# Patient Record
Sex: FEMALE | Race: WHITE | NOT HISPANIC OR LATINO | Employment: FULL TIME | URBAN - METROPOLITAN AREA
[De-identification: names, ages, dates, MRNs, and addresses within clinical notes are randomized per-mention and may not be internally consistent; named-entity substitution may affect disease eponyms.]

---

## 2017-01-06 ENCOUNTER — COMMUNICATION - HEALTHEAST (OUTPATIENT)
Dept: LAB | Facility: CLINIC | Age: 47
End: 2017-01-06

## 2017-01-06 DIAGNOSIS — E03.9 HYPOTHYROIDISM: ICD-10-CM

## 2017-02-08 ENCOUNTER — AMBULATORY - HEALTHEAST (OUTPATIENT)
Dept: LAB | Facility: CLINIC | Age: 47
End: 2017-02-08

## 2017-02-08 DIAGNOSIS — E03.9 HYPOTHYROIDISM: ICD-10-CM

## 2017-02-08 LAB
CREAT SERPL-MCNC: 0.78 MG/DL (ref 0.6–1.1)
GFR SERPL CREATININE-BSD FRML MDRD: >60 ML/MIN/1.73M2

## 2017-02-12 ENCOUNTER — RECORDS - HEALTHEAST (OUTPATIENT)
Dept: ADMINISTRATIVE | Facility: OTHER | Age: 47
End: 2017-02-12

## 2017-02-14 ENCOUNTER — AMBULATORY - HEALTHEAST (OUTPATIENT)
Dept: ENDOCRINOLOGY | Facility: CLINIC | Age: 47
End: 2017-02-14

## 2017-02-14 DIAGNOSIS — E03.9 HYPOTHYROIDISM: ICD-10-CM

## 2017-02-15 ENCOUNTER — OFFICE VISIT - HEALTHEAST (OUTPATIENT)
Dept: FAMILY MEDICINE | Facility: CLINIC | Age: 47
End: 2017-02-15

## 2017-02-15 DIAGNOSIS — F41.1 ANXIETY STATE: ICD-10-CM

## 2017-02-15 DIAGNOSIS — E03.9 HYPOTHYROIDISM: ICD-10-CM

## 2017-02-17 ENCOUNTER — COMMUNICATION - HEALTHEAST (OUTPATIENT)
Dept: ADMINISTRATIVE | Facility: CLINIC | Age: 47
End: 2017-02-17

## 2017-03-02 ENCOUNTER — COMMUNICATION - HEALTHEAST (OUTPATIENT)
Dept: FAMILY MEDICINE | Facility: CLINIC | Age: 47
End: 2017-03-02

## 2017-03-02 DIAGNOSIS — F41.1 ANXIETY STATE: ICD-10-CM

## 2017-03-09 ENCOUNTER — OFFICE VISIT - HEALTHEAST (OUTPATIENT)
Dept: FAMILY MEDICINE | Facility: CLINIC | Age: 47
End: 2017-03-09

## 2017-03-09 DIAGNOSIS — H65.191 ACUTE MIDDLE EAR EFFUSION, RIGHT: ICD-10-CM

## 2017-03-09 DIAGNOSIS — J02.9 SORE THROAT: ICD-10-CM

## 2017-03-09 DIAGNOSIS — J06.9 VIRAL URI: ICD-10-CM

## 2017-03-13 ENCOUNTER — COMMUNICATION - HEALTHEAST (OUTPATIENT)
Dept: ENDOCRINOLOGY | Facility: CLINIC | Age: 47
End: 2017-03-13

## 2017-03-13 DIAGNOSIS — E03.9 HYPOTHYROIDISM: ICD-10-CM

## 2017-04-12 ENCOUNTER — AMBULATORY - HEALTHEAST (OUTPATIENT)
Dept: LAB | Facility: CLINIC | Age: 47
End: 2017-04-12

## 2017-04-12 DIAGNOSIS — E03.9 HYPOTHYROIDISM: ICD-10-CM

## 2017-04-12 DIAGNOSIS — F41.1 ANXIETY STATE: ICD-10-CM

## 2017-04-12 LAB
CHOLEST SERPL-MCNC: 136 MG/DL
FASTING STATUS PATIENT QL REPORTED: YES
HDLC SERPL-MCNC: 56 MG/DL
LDLC SERPL CALC-MCNC: 69 MG/DL
TRIGL SERPL-MCNC: 53 MG/DL

## 2017-04-14 ENCOUNTER — OFFICE VISIT - HEALTHEAST (OUTPATIENT)
Dept: ENDOCRINOLOGY | Facility: CLINIC | Age: 47
End: 2017-04-14

## 2017-04-14 DIAGNOSIS — C73 THYROID CANCER (H): ICD-10-CM

## 2017-04-14 DIAGNOSIS — E03.9 HYPOTHYROIDISM: ICD-10-CM

## 2017-04-14 ASSESSMENT — MIFFLIN-ST. JEOR: SCORE: 1164.27

## 2017-04-15 ENCOUNTER — COMMUNICATION - HEALTHEAST (OUTPATIENT)
Dept: FAMILY MEDICINE | Facility: CLINIC | Age: 47
End: 2017-04-15

## 2017-04-18 ENCOUNTER — COMMUNICATION - HEALTHEAST (OUTPATIENT)
Dept: ENDOCRINOLOGY | Facility: CLINIC | Age: 47
End: 2017-04-18

## 2017-05-27 ENCOUNTER — RECORDS - HEALTHEAST (OUTPATIENT)
Dept: ADMINISTRATIVE | Facility: OTHER | Age: 47
End: 2017-05-27

## 2017-07-05 ENCOUNTER — RECORDS - HEALTHEAST (OUTPATIENT)
Dept: ADMINISTRATIVE | Facility: OTHER | Age: 47
End: 2017-07-05

## 2017-07-05 LAB — PAP SMEAR - HIM PATIENT REPORTED: NORMAL

## 2017-07-08 ENCOUNTER — COMMUNICATION - HEALTHEAST (OUTPATIENT)
Dept: FAMILY MEDICINE | Facility: CLINIC | Age: 47
End: 2017-07-08

## 2017-07-08 DIAGNOSIS — F41.9 ANXIETY: ICD-10-CM

## 2017-09-06 ENCOUNTER — COMMUNICATION - HEALTHEAST (OUTPATIENT)
Dept: ADMINISTRATIVE | Facility: CLINIC | Age: 47
End: 2017-09-06

## 2017-09-06 ENCOUNTER — AMBULATORY - HEALTHEAST (OUTPATIENT)
Dept: ENDOCRINOLOGY | Facility: CLINIC | Age: 47
End: 2017-09-06

## 2017-09-06 DIAGNOSIS — E03.9 HYPOTHYROIDISM: ICD-10-CM

## 2017-09-13 ENCOUNTER — AMBULATORY - HEALTHEAST (OUTPATIENT)
Dept: LAB | Facility: CLINIC | Age: 47
End: 2017-09-13

## 2017-09-13 DIAGNOSIS — E03.9 HYPOTHYROIDISM: ICD-10-CM

## 2017-09-19 ENCOUNTER — COMMUNICATION - HEALTHEAST (OUTPATIENT)
Dept: ENDOCRINOLOGY | Facility: CLINIC | Age: 47
End: 2017-09-19

## 2017-10-02 ENCOUNTER — COMMUNICATION - HEALTHEAST (OUTPATIENT)
Dept: FAMILY MEDICINE | Facility: CLINIC | Age: 47
End: 2017-10-02

## 2017-10-09 ENCOUNTER — COMMUNICATION - HEALTHEAST (OUTPATIENT)
Dept: FAMILY MEDICINE | Facility: CLINIC | Age: 47
End: 2017-10-09

## 2017-10-09 DIAGNOSIS — F41.9 ANXIETY: ICD-10-CM

## 2017-10-26 ENCOUNTER — OFFICE VISIT - HEALTHEAST (OUTPATIENT)
Dept: FAMILY MEDICINE | Facility: CLINIC | Age: 47
End: 2017-10-26

## 2017-10-26 DIAGNOSIS — R53.83 FATIGUE: ICD-10-CM

## 2017-10-26 DIAGNOSIS — Z12.39 SCREENING FOR BREAST CANCER: ICD-10-CM

## 2017-10-26 DIAGNOSIS — Z12.31 ENCOUNTER FOR SCREENING MAMMOGRAM FOR MALIGNANT NEOPLASM OF BREAST: ICD-10-CM

## 2017-10-26 DIAGNOSIS — R10.9 ABDOMINAL PAIN: ICD-10-CM

## 2017-10-26 DIAGNOSIS — D12.6 ADENOMATOUS COLON POLYP: ICD-10-CM

## 2017-10-29 ENCOUNTER — COMMUNICATION - HEALTHEAST (OUTPATIENT)
Dept: FAMILY MEDICINE | Facility: CLINIC | Age: 47
End: 2017-10-29

## 2017-11-14 ENCOUNTER — COMMUNICATION - HEALTHEAST (OUTPATIENT)
Dept: FAMILY MEDICINE | Facility: CLINIC | Age: 47
End: 2017-11-14

## 2017-11-14 DIAGNOSIS — F41.9 ANXIETY: ICD-10-CM

## 2017-12-07 ENCOUNTER — COMMUNICATION - HEALTHEAST (OUTPATIENT)
Dept: FAMILY MEDICINE | Facility: CLINIC | Age: 47
End: 2017-12-07

## 2017-12-07 DIAGNOSIS — F41.9 ANXIETY: ICD-10-CM

## 2017-12-28 ENCOUNTER — OFFICE VISIT - HEALTHEAST (OUTPATIENT)
Dept: ENDOCRINOLOGY | Facility: CLINIC | Age: 47
End: 2017-12-28

## 2017-12-28 DIAGNOSIS — E03.9 HYPOTHYROIDISM: ICD-10-CM

## 2017-12-28 ASSESSMENT — MIFFLIN-ST. JEOR: SCORE: 1185.14

## 2018-01-10 ENCOUNTER — HOSPITAL ENCOUNTER (OUTPATIENT)
Dept: MAMMOGRAPHY | Facility: HOSPITAL | Age: 48
Discharge: HOME | End: 2018-01-10
Attending: FAMILY MEDICINE

## 2018-01-10 DIAGNOSIS — Z12.31 ENCOUNTER FOR SCREENING MAMMOGRAM FOR MALIGNANT NEOPLASM OF BREAST: ICD-10-CM

## 2018-01-16 ENCOUNTER — HOSPITAL ENCOUNTER (OUTPATIENT)
Dept: MAMMOGRAPHY | Facility: HOSPITAL | Age: 48
Discharge: HOME | End: 2018-01-16
Attending: FAMILY MEDICINE

## 2018-01-16 DIAGNOSIS — N64.89 DISTORTION OF CONTOUR OF BREAST: ICD-10-CM

## 2018-02-07 ENCOUNTER — RECORDS - HEALTHEAST (OUTPATIENT)
Dept: ADMINISTRATIVE | Facility: OTHER | Age: 48
End: 2018-02-07

## 2018-03-07 ENCOUNTER — RECORDS - HEALTHEAST (OUTPATIENT)
Dept: ADMINISTRATIVE | Facility: OTHER | Age: 48
End: 2018-03-07

## 2018-06-14 ENCOUNTER — AMBULATORY - HEALTHEAST (OUTPATIENT)
Dept: ENDOCRINOLOGY | Facility: CLINIC | Age: 48
End: 2018-06-14

## 2018-06-14 DIAGNOSIS — E03.9 HYPOTHYROIDISM: ICD-10-CM

## 2018-06-18 ENCOUNTER — COMMUNICATION - HEALTHEAST (OUTPATIENT)
Dept: ENDOCRINOLOGY | Facility: CLINIC | Age: 48
End: 2018-06-18

## 2018-06-18 DIAGNOSIS — E03.9 HYPOTHYROIDISM: ICD-10-CM

## 2018-06-20 ENCOUNTER — AMBULATORY - HEALTHEAST (OUTPATIENT)
Dept: LAB | Facility: CLINIC | Age: 48
End: 2018-06-20

## 2018-06-20 DIAGNOSIS — E03.9 HYPOTHYROIDISM: ICD-10-CM

## 2018-06-20 LAB
CALCIUM SERPL-MCNC: 9.1 MG/DL (ref 8.5–10.5)
CREAT SERPL-MCNC: 0.83 MG/DL (ref 0.6–1.1)
GFR SERPL CREATININE-BSD FRML MDRD: >60 ML/MIN/1.73M2
POTASSIUM BLD-SCNC: 4.2 MMOL/L (ref 3.5–5)
T3 SERPL-MCNC: 63 NG/DL (ref 45–175)
T4 FREE SERPL-MCNC: 1.2 NG/DL (ref 0.7–1.8)
TSH SERPL DL<=0.005 MIU/L-ACNC: 0.08 UIU/ML (ref 0.3–5)

## 2018-06-21 LAB — 25(OH)D3 SERPL-MCNC: 34.8 NG/ML (ref 30–80)

## 2018-06-22 LAB
THYROGLOB AB SERPL-ACNC: <0.9 IU/ML (ref 0–4)
THYROGLOB SERPL-MCNC: ABNORMAL NG/ML (ref 1.3–31.8)
THYROGLOBULIN, SERUM OR: <0.1 NG/ML (ref 1.3–31.8)

## 2018-06-29 ENCOUNTER — OFFICE VISIT - HEALTHEAST (OUTPATIENT)
Dept: ENDOCRINOLOGY | Facility: CLINIC | Age: 48
End: 2018-06-29

## 2018-06-29 DIAGNOSIS — E03.9 HYPOTHYROIDISM: ICD-10-CM

## 2018-06-29 ASSESSMENT — MIFFLIN-ST. JEOR: SCORE: 1158.83

## 2018-10-02 ENCOUNTER — RECORDS - HEALTHEAST (OUTPATIENT)
Dept: ADMINISTRATIVE | Facility: OTHER | Age: 48
End: 2018-10-02

## 2018-11-19 ENCOUNTER — RECORDS - HEALTHEAST (OUTPATIENT)
Dept: ADMINISTRATIVE | Facility: OTHER | Age: 48
End: 2018-11-19

## 2018-12-07 ENCOUNTER — AMBULATORY - HEALTHEAST (OUTPATIENT)
Dept: ENDOCRINOLOGY | Facility: CLINIC | Age: 48
End: 2018-12-07

## 2018-12-07 DIAGNOSIS — E03.9 HYPOTHYROIDISM: ICD-10-CM

## 2018-12-26 ENCOUNTER — AMBULATORY - HEALTHEAST (OUTPATIENT)
Dept: LAB | Facility: CLINIC | Age: 48
End: 2018-12-26

## 2018-12-26 DIAGNOSIS — E03.9 HYPOTHYROIDISM: ICD-10-CM

## 2018-12-26 LAB
CALCIUM SERPL-MCNC: 8.7 MG/DL (ref 8.5–10.5)
CREAT SERPL-MCNC: 0.76 MG/DL (ref 0.6–1.1)
GFR SERPL CREATININE-BSD FRML MDRD: >60 ML/MIN/1.73M2
POTASSIUM BLD-SCNC: 4.3 MMOL/L (ref 3.5–5)
T3 SERPL-MCNC: 66 NG/DL (ref 45–175)
T4 FREE SERPL-MCNC: 1.1 NG/DL (ref 0.7–1.8)
TSH SERPL DL<=0.005 MIU/L-ACNC: 0.03 UIU/ML (ref 0.3–5)

## 2018-12-27 ENCOUNTER — COMMUNICATION - HEALTHEAST (OUTPATIENT)
Dept: FAMILY MEDICINE | Facility: CLINIC | Age: 48
End: 2018-12-27

## 2018-12-27 LAB — 25(OH)D3 SERPL-MCNC: 41.6 NG/ML (ref 30–80)

## 2019-01-04 ENCOUNTER — OFFICE VISIT - HEALTHEAST (OUTPATIENT)
Dept: ENDOCRINOLOGY | Facility: CLINIC | Age: 49
End: 2019-01-04

## 2019-01-04 DIAGNOSIS — E06.3 HYPOTHYROIDISM DUE TO HASHIMOTO'S THYROIDITIS: ICD-10-CM

## 2019-01-04 ASSESSMENT — MIFFLIN-ST. JEOR: SCORE: 1185.14

## 2019-02-14 ENCOUNTER — COMMUNICATION - HEALTHEAST (OUTPATIENT)
Dept: ENDOCRINOLOGY | Facility: CLINIC | Age: 49
End: 2019-02-14

## 2019-02-14 DIAGNOSIS — E03.9 HYPOTHYROIDISM: ICD-10-CM

## 2019-03-06 ENCOUNTER — OFFICE VISIT - HEALTHEAST (OUTPATIENT)
Dept: FAMILY MEDICINE | Facility: CLINIC | Age: 49
End: 2019-03-06

## 2019-03-06 DIAGNOSIS — F41.1 ANXIETY STATE: ICD-10-CM

## 2019-03-06 DIAGNOSIS — H65.90 FLUID COLLECTION OF MIDDLE EAR: ICD-10-CM

## 2019-03-06 DIAGNOSIS — Z12.39 SCREENING FOR MALIGNANT NEOPLASM OF BREAST: ICD-10-CM

## 2019-03-06 DIAGNOSIS — E06.3 HYPOTHYROIDISM DUE TO HASHIMOTO'S THYROIDITIS: ICD-10-CM

## 2019-03-06 DIAGNOSIS — Z12.31 VISIT FOR SCREENING MAMMOGRAM: ICD-10-CM

## 2019-03-06 LAB
ALBUMIN SERPL-MCNC: 4.3 G/DL (ref 3.5–5)
ALP SERPL-CCNC: 56 U/L (ref 45–120)
ALT SERPL W P-5'-P-CCNC: 10 U/L (ref 0–45)
ANION GAP SERPL CALCULATED.3IONS-SCNC: 9 MMOL/L (ref 5–18)
AST SERPL W P-5'-P-CCNC: 20 U/L (ref 0–40)
BILIRUB SERPL-MCNC: 0.4 MG/DL (ref 0–1)
BUN SERPL-MCNC: 17 MG/DL (ref 8–22)
CALCIUM SERPL-MCNC: 9.6 MG/DL (ref 8.5–10.5)
CHLORIDE BLD-SCNC: 105 MMOL/L (ref 98–107)
CHOLEST SERPL-MCNC: 141 MG/DL
CO2 SERPL-SCNC: 28 MMOL/L (ref 22–31)
CREAT SERPL-MCNC: 0.8 MG/DL (ref 0.6–1.1)
ERYTHROCYTE [DISTWIDTH] IN BLOOD BY AUTOMATED COUNT: 11.5 % (ref 11–14.5)
FASTING STATUS PATIENT QL REPORTED: NO
GFR SERPL CREATININE-BSD FRML MDRD: >60 ML/MIN/1.73M2
GLUCOSE BLD-MCNC: 89 MG/DL (ref 70–125)
HCT VFR BLD AUTO: 40 % (ref 35–47)
HDLC SERPL-MCNC: 58 MG/DL
HGB BLD-MCNC: 13.5 G/DL (ref 12–16)
LDLC SERPL CALC-MCNC: 56 MG/DL
MCH RBC QN AUTO: 30.7 PG (ref 27–34)
MCHC RBC AUTO-ENTMCNC: 33.8 G/DL (ref 32–36)
MCV RBC AUTO: 91 FL (ref 80–100)
PLATELET # BLD AUTO: 192 THOU/UL (ref 140–440)
PMV BLD AUTO: 7.3 FL (ref 7–10)
POTASSIUM BLD-SCNC: 4.4 MMOL/L (ref 3.5–5)
PROT SERPL-MCNC: 7.5 G/DL (ref 6–8)
RBC # BLD AUTO: 4.41 MILL/UL (ref 3.8–5.4)
SODIUM SERPL-SCNC: 142 MMOL/L (ref 136–145)
TRIGL SERPL-MCNC: 137 MG/DL
WBC: 4.6 THOU/UL (ref 4–11)

## 2019-03-07 ENCOUNTER — COMMUNICATION - HEALTHEAST (OUTPATIENT)
Dept: FAMILY MEDICINE | Facility: CLINIC | Age: 49
End: 2019-03-07

## 2019-03-10 ENCOUNTER — COMMUNICATION - HEALTHEAST (OUTPATIENT)
Dept: FAMILY MEDICINE | Facility: CLINIC | Age: 49
End: 2019-03-10

## 2019-04-02 ENCOUNTER — HOSPITAL ENCOUNTER (OUTPATIENT)
Dept: MAMMOGRAPHY | Facility: CLINIC | Age: 49
Discharge: HOME | End: 2019-04-02
Attending: FAMILY MEDICINE

## 2019-04-02 DIAGNOSIS — Z12.31 VISIT FOR SCREENING MAMMOGRAM: ICD-10-CM

## 2019-05-31 ENCOUNTER — COMMUNICATION - HEALTHEAST (OUTPATIENT)
Dept: FAMILY MEDICINE | Facility: CLINIC | Age: 49
End: 2019-05-31

## 2019-05-31 DIAGNOSIS — E03.9 HYPOTHYROIDISM: ICD-10-CM

## 2019-06-22 ENCOUNTER — COMMUNICATION - HEALTHEAST (OUTPATIENT)
Dept: ENDOCRINOLOGY | Facility: CLINIC | Age: 49
End: 2019-06-22

## 2019-06-22 DIAGNOSIS — E03.9 HYPOTHYROIDISM: ICD-10-CM

## 2019-07-11 ENCOUNTER — RECORDS - HEALTHEAST (OUTPATIENT)
Dept: ADMINISTRATIVE | Facility: OTHER | Age: 49
End: 2019-07-11

## 2019-07-12 ENCOUNTER — RECORDS - HEALTHEAST (OUTPATIENT)
Dept: ADMINISTRATIVE | Facility: OTHER | Age: 49
End: 2019-07-12

## 2019-11-18 ENCOUNTER — RECORDS - HEALTHEAST (OUTPATIENT)
Dept: ADMINISTRATIVE | Facility: OTHER | Age: 49
End: 2019-11-18

## 2019-12-10 ENCOUNTER — COMMUNICATION - HEALTHEAST (OUTPATIENT)
Dept: FAMILY MEDICINE | Facility: CLINIC | Age: 49
End: 2019-12-10

## 2019-12-10 DIAGNOSIS — E03.9 HYPOTHYROIDISM: ICD-10-CM

## 2019-12-11 ENCOUNTER — COMMUNICATION - HEALTHEAST (OUTPATIENT)
Dept: FAMILY MEDICINE | Facility: CLINIC | Age: 49
End: 2019-12-11

## 2019-12-14 ENCOUNTER — COMMUNICATION - HEALTHEAST (OUTPATIENT)
Dept: SCHEDULING | Facility: CLINIC | Age: 49
End: 2019-12-14

## 2019-12-14 DIAGNOSIS — E03.9 HYPOTHYROIDISM, UNSPECIFIED TYPE: ICD-10-CM

## 2019-12-14 DIAGNOSIS — E03.9 HYPOTHYROIDISM: ICD-10-CM

## 2019-12-18 ENCOUNTER — AMBULATORY - HEALTHEAST (OUTPATIENT)
Dept: LAB | Facility: CLINIC | Age: 49
End: 2019-12-18

## 2019-12-18 DIAGNOSIS — E03.9 HYPOTHYROIDISM, UNSPECIFIED TYPE: ICD-10-CM

## 2019-12-18 LAB
T4 FREE SERPL-MCNC: 1.2 NG/DL (ref 0.7–1.8)
TSH SERPL DL<=0.005 MIU/L-ACNC: 0.07 UIU/ML (ref 0.3–5)

## 2019-12-23 ENCOUNTER — COMMUNICATION - HEALTHEAST (OUTPATIENT)
Dept: FAMILY MEDICINE | Facility: CLINIC | Age: 49
End: 2019-12-23

## 2019-12-23 DIAGNOSIS — E03.9 HYPOTHYROIDISM: ICD-10-CM

## 2019-12-28 ENCOUNTER — COMMUNICATION - HEALTHEAST (OUTPATIENT)
Dept: FAMILY MEDICINE | Facility: CLINIC | Age: 49
End: 2019-12-28

## 2019-12-28 DIAGNOSIS — E03.9 HYPOTHYROIDISM, UNSPECIFIED TYPE: ICD-10-CM

## 2020-01-08 ENCOUNTER — AMBULATORY - HEALTHEAST (OUTPATIENT)
Dept: LAB | Facility: CLINIC | Age: 50
End: 2020-01-08

## 2020-01-08 DIAGNOSIS — E03.9 HYPOTHYROIDISM, UNSPECIFIED TYPE: ICD-10-CM

## 2020-01-16 ENCOUNTER — RECORDS - HEALTHEAST (OUTPATIENT)
Dept: HEALTH INFORMATION MANAGEMENT | Facility: CLINIC | Age: 50
End: 2020-01-16

## 2020-01-20 ENCOUNTER — OFFICE VISIT - HEALTHEAST (OUTPATIENT)
Dept: FAMILY MEDICINE | Facility: CLINIC | Age: 50
End: 2020-01-20

## 2020-01-20 DIAGNOSIS — B37.31 YEAST INFECTION OF THE VAGINA: ICD-10-CM

## 2020-01-20 DIAGNOSIS — E03.9 HYPOTHYROIDISM, UNSPECIFIED TYPE: ICD-10-CM

## 2020-01-20 DIAGNOSIS — C73 THYROID CANCER (H): ICD-10-CM

## 2020-01-20 DIAGNOSIS — D68.59 PRIMARY HYPERCOAGULABLE STATE (H): ICD-10-CM

## 2020-01-20 DIAGNOSIS — M35.00 SICCA SYNDROME (H): ICD-10-CM

## 2020-01-20 DIAGNOSIS — R53.83 OTHER FATIGUE: ICD-10-CM

## 2020-01-20 LAB
ALBUMIN SERPL-MCNC: 4.3 G/DL (ref 3.5–5)
ALP SERPL-CCNC: 70 U/L (ref 45–120)
ALT SERPL W P-5'-P-CCNC: 9 U/L (ref 0–45)
ANION GAP SERPL CALCULATED.3IONS-SCNC: 9 MMOL/L (ref 5–18)
AST SERPL W P-5'-P-CCNC: 19 U/L (ref 0–40)
BILIRUB SERPL-MCNC: 0.5 MG/DL (ref 0–1)
BUN SERPL-MCNC: 18 MG/DL (ref 8–22)
CALCIUM SERPL-MCNC: 9.3 MG/DL (ref 8.5–10.5)
CHLORIDE BLD-SCNC: 106 MMOL/L (ref 98–107)
CHOLEST SERPL-MCNC: 144 MG/DL
CO2 SERPL-SCNC: 26 MMOL/L (ref 22–31)
CREAT SERPL-MCNC: 0.9 MG/DL (ref 0.6–1.1)
FASTING STATUS PATIENT QL REPORTED: YES
GFR SERPL CREATININE-BSD FRML MDRD: >60 ML/MIN/1.73M2
GLUCOSE BLD-MCNC: 89 MG/DL (ref 70–125)
HDLC SERPL-MCNC: 56 MG/DL
IRON SATN MFR SERPL: 35 % (ref 20–50)
IRON SERPL-MCNC: 113 UG/DL (ref 42–175)
LDLC SERPL CALC-MCNC: 73 MG/DL
MAGNESIUM SERPL-MCNC: 2 MG/DL (ref 1.8–2.6)
POTASSIUM BLD-SCNC: 4.4 MMOL/L (ref 3.5–5)
PROT SERPL-MCNC: 7.2 G/DL (ref 6–8)
SODIUM SERPL-SCNC: 141 MMOL/L (ref 136–145)
TIBC SERPL-MCNC: 327 UG/DL (ref 313–563)
TRANSFERRIN SERPL-MCNC: 261 MG/DL (ref 212–360)
TRIGL SERPL-MCNC: 75 MG/DL
VIT B12 SERPL-MCNC: 532 PG/ML (ref 213–816)

## 2020-01-20 ASSESSMENT — ANXIETY QUESTIONNAIRES
IF YOU CHECKED OFF ANY PROBLEMS ON THIS QUESTIONNAIRE, HOW DIFFICULT HAVE THESE PROBLEMS MADE IT FOR YOU TO DO YOUR WORK, TAKE CARE OF THINGS AT HOME, OR GET ALONG WITH OTHER PEOPLE: NOT DIFFICULT AT ALL
6. BECOMING EASILY ANNOYED OR IRRITABLE: NOT AT ALL
7. FEELING AFRAID AS IF SOMETHING AWFUL MIGHT HAPPEN: NOT AT ALL
1. FEELING NERVOUS, ANXIOUS, OR ON EDGE: NOT AT ALL
3. WORRYING TOO MUCH ABOUT DIFFERENT THINGS: NOT AT ALL
5. BEING SO RESTLESS THAT IT IS HARD TO SIT STILL: NOT AT ALL
2. NOT BEING ABLE TO STOP OR CONTROL WORRYING: NOT AT ALL
4. TROUBLE RELAXING: NOT AT ALL
GAD7 TOTAL SCORE: 0

## 2020-01-20 ASSESSMENT — MIFFLIN-ST. JEOR: SCORE: 1214.17

## 2020-01-20 ASSESSMENT — PATIENT HEALTH QUESTIONNAIRE - PHQ9: SUM OF ALL RESPONSES TO PHQ QUESTIONS 1-9: 7

## 2020-01-21 ENCOUNTER — COMMUNICATION - HEALTHEAST (OUTPATIENT)
Dept: FAMILY MEDICINE | Facility: CLINIC | Age: 50
End: 2020-01-21

## 2020-01-21 LAB
25(OH)D3 SERPL-MCNC: 38.7 NG/ML (ref 30–80)
BASOPHILS # BLD AUTO: 0 THOU/UL (ref 0–0.2)
BASOPHILS NFR BLD AUTO: 1 % (ref 0–2)
EOSINOPHIL # BLD AUTO: 0.2 THOU/UL (ref 0–0.4)
EOSINOPHIL NFR BLD AUTO: 5 % (ref 0–6)
ERYTHROCYTE [DISTWIDTH] IN BLOOD BY AUTOMATED COUNT: 12.2 % (ref 11–14.5)
HCT VFR BLD AUTO: 40.3 % (ref 35–47)
HGB BLD-MCNC: 13.2 G/DL (ref 12–16)
LAB AP CHARGES (HE HISTORICAL CONVERSION): NORMAL
LYMPHOCYTES # BLD AUTO: 1.1 THOU/UL (ref 0.8–4.4)
LYMPHOCYTES NFR BLD AUTO: 28 % (ref 20–40)
MCH RBC QN AUTO: 30.4 PG (ref 27–34)
MCHC RBC AUTO-ENTMCNC: 32.8 G/DL (ref 32–36)
MCV RBC AUTO: 93 FL (ref 80–100)
MONOCYTES # BLD AUTO: 0.4 THOU/UL (ref 0–0.9)
MONOCYTES NFR BLD AUTO: 10 % (ref 2–10)
NEUTROPHILS # BLD AUTO: 2.3 THOU/UL (ref 2–7.7)
NEUTROPHILS NFR BLD AUTO: 57 % (ref 50–70)
PATH REPORT.COMMENTS IMP SPEC: NORMAL
PATH REPORT.COMMENTS IMP SPEC: NORMAL
PATH REPORT.FINAL DX SPEC: NORMAL
PATH REPORT.MICROSCOPIC SPEC OTHER STN: ABNORMAL
PATH REPORT.MICROSCOPIC SPEC OTHER STN: NORMAL
PATH REPORT.RELEVANT HX SPEC: NORMAL
PLATELET # BLD AUTO: 176 THOU/UL (ref 140–440)
PMV BLD AUTO: 10.4 FL (ref 8.5–12.5)
RBC # BLD AUTO: 4.34 MILL/UL (ref 3.8–5.4)
WBC: 4 THOU/UL (ref 4–11)

## 2020-03-11 ENCOUNTER — COMMUNICATION - HEALTHEAST (OUTPATIENT)
Dept: FAMILY MEDICINE | Facility: CLINIC | Age: 50
End: 2020-03-11

## 2020-03-19 ENCOUNTER — COMMUNICATION - HEALTHEAST (OUTPATIENT)
Dept: FAMILY MEDICINE | Facility: CLINIC | Age: 50
End: 2020-03-19

## 2020-03-19 DIAGNOSIS — F41.1 ANXIETY STATE: ICD-10-CM

## 2020-04-21 ENCOUNTER — RECORDS - HEALTHEAST (OUTPATIENT)
Dept: ADMINISTRATIVE | Facility: OTHER | Age: 50
End: 2020-04-21

## 2020-07-08 ENCOUNTER — COMMUNICATION - HEALTHEAST (OUTPATIENT)
Dept: FAMILY MEDICINE | Facility: CLINIC | Age: 50
End: 2020-07-08

## 2020-07-14 ENCOUNTER — COMMUNICATION - HEALTHEAST (OUTPATIENT)
Dept: FAMILY MEDICINE | Facility: CLINIC | Age: 50
End: 2020-07-14

## 2020-08-21 ENCOUNTER — OFFICE VISIT - HEALTHEAST (OUTPATIENT)
Dept: FAMILY MEDICINE | Facility: CLINIC | Age: 50
End: 2020-08-21

## 2020-08-21 DIAGNOSIS — Z00.00 ROUTINE GENERAL MEDICAL EXAMINATION AT A HEALTH CARE FACILITY: ICD-10-CM

## 2020-08-21 DIAGNOSIS — D23.9 DYSPLASTIC NEVI: ICD-10-CM

## 2020-08-21 DIAGNOSIS — D12.6 ADENOMATOUS POLYP OF COLON, UNSPECIFIED PART OF COLON: ICD-10-CM

## 2020-08-21 DIAGNOSIS — Z78.0 MENOPAUSE: ICD-10-CM

## 2020-08-21 DIAGNOSIS — E03.9 HYPOTHYROIDISM, UNSPECIFIED TYPE: ICD-10-CM

## 2020-08-21 DIAGNOSIS — M35.00 SICCA SYNDROME (H): ICD-10-CM

## 2020-08-21 DIAGNOSIS — H90.11 CONDUCTIVE HEARING LOSS OF RIGHT EAR WITH UNRESTRICTED HEARING OF LEFT EAR: ICD-10-CM

## 2020-08-21 DIAGNOSIS — C73 THYROID CANCER (H): ICD-10-CM

## 2020-08-21 DIAGNOSIS — Z12.31 VISIT FOR SCREENING MAMMOGRAM: ICD-10-CM

## 2020-08-21 DIAGNOSIS — R22.42 MASS OF LEFT LOWER EXTREMITY: ICD-10-CM

## 2020-08-21 LAB
ALBUMIN SERPL-MCNC: 4.4 G/DL (ref 3.5–5)
ALP SERPL-CCNC: 77 U/L (ref 45–120)
ALT SERPL W P-5'-P-CCNC: 15 U/L (ref 0–45)
ANION GAP SERPL CALCULATED.3IONS-SCNC: 10 MMOL/L (ref 5–18)
AST SERPL W P-5'-P-CCNC: 19 U/L (ref 0–40)
BILIRUB SERPL-MCNC: 0.4 MG/DL (ref 0–1)
BUN SERPL-MCNC: 17 MG/DL (ref 8–22)
CALCIUM SERPL-MCNC: 9.5 MG/DL (ref 8.5–10.5)
CHLORIDE BLD-SCNC: 105 MMOL/L (ref 98–107)
CHOLEST SERPL-MCNC: 158 MG/DL
CO2 SERPL-SCNC: 27 MMOL/L (ref 22–31)
CREAT SERPL-MCNC: 0.83 MG/DL (ref 0.6–1.1)
FASTING STATUS PATIENT QL REPORTED: NO
GFR SERPL CREATININE-BSD FRML MDRD: >60 ML/MIN/1.73M2
GLUCOSE BLD-MCNC: 85 MG/DL (ref 70–125)
HDLC SERPL-MCNC: 61 MG/DL
LDLC SERPL CALC-MCNC: 82 MG/DL
POTASSIUM BLD-SCNC: 4.1 MMOL/L (ref 3.5–5)
PROT SERPL-MCNC: 7.4 G/DL (ref 6–8)
SODIUM SERPL-SCNC: 142 MMOL/L (ref 136–145)
TRIGL SERPL-MCNC: 77 MG/DL

## 2020-08-21 ASSESSMENT — MIFFLIN-ST. JEOR: SCORE: 1189.68

## 2020-08-23 LAB
THYROGLOB AB SERPL-ACNC: <0.9 IU/ML (ref 0–4)
THYROGLOB AB SERPL-ACNC: <0.9 IU/ML (ref 0–4)
THYROGLOB SERPL-MCNC: ABNORMAL NG/ML (ref 1.3–31.8)
THYROGLOBULIN, SERUM OR: <0.1 NG/ML (ref 1.3–31.8)

## 2020-08-24 ENCOUNTER — COMMUNICATION - HEALTHEAST (OUTPATIENT)
Dept: FAMILY MEDICINE | Facility: CLINIC | Age: 50
End: 2020-08-24

## 2020-08-27 ENCOUNTER — AMBULATORY - HEALTHEAST (OUTPATIENT)
Dept: SCHEDULING | Facility: CLINIC | Age: 50
End: 2020-08-27

## 2020-08-27 DIAGNOSIS — Z78.0 MENOPAUSE: ICD-10-CM

## 2020-08-31 ENCOUNTER — HOSPITAL ENCOUNTER (OUTPATIENT)
Dept: MRI IMAGING | Facility: HOSPITAL | Age: 50
Discharge: HOME | End: 2020-08-31
Attending: FAMILY MEDICINE

## 2020-08-31 DIAGNOSIS — R22.42 MASS OF LEFT LOWER EXTREMITY: ICD-10-CM

## 2020-09-02 ENCOUNTER — COMMUNICATION - HEALTHEAST (OUTPATIENT)
Dept: FAMILY MEDICINE | Facility: CLINIC | Age: 50
End: 2020-09-02

## 2020-09-03 ENCOUNTER — HOSPITAL ENCOUNTER (OUTPATIENT)
Dept: MAMMOGRAPHY | Facility: CLINIC | Age: 50
Discharge: HOME | End: 2020-09-03
Attending: FAMILY MEDICINE

## 2020-09-03 DIAGNOSIS — Z12.31 VISIT FOR SCREENING MAMMOGRAM: ICD-10-CM

## 2020-09-18 ENCOUNTER — COMMUNICATION - HEALTHEAST (OUTPATIENT)
Dept: FAMILY MEDICINE | Facility: CLINIC | Age: 50
End: 2020-09-18

## 2020-11-24 ENCOUNTER — RECORDS - HEALTHEAST (OUTPATIENT)
Dept: ADMINISTRATIVE | Facility: OTHER | Age: 50
End: 2020-11-24

## 2020-11-30 ENCOUNTER — OFFICE VISIT - HEALTHEAST (OUTPATIENT)
Dept: FAMILY MEDICINE | Facility: CLINIC | Age: 50
End: 2020-11-30

## 2020-11-30 DIAGNOSIS — R00.2 PALPITATIONS: ICD-10-CM

## 2020-11-30 ASSESSMENT — ANXIETY QUESTIONNAIRES
GAD7 TOTAL SCORE: 6
5. BEING SO RESTLESS THAT IT IS HARD TO SIT STILL: NOT AT ALL
3. WORRYING TOO MUCH ABOUT DIFFERENT THINGS: SEVERAL DAYS
2. NOT BEING ABLE TO STOP OR CONTROL WORRYING: SEVERAL DAYS
4. TROUBLE RELAXING: SEVERAL DAYS
1. FEELING NERVOUS, ANXIOUS, OR ON EDGE: MORE THAN HALF THE DAYS
7. FEELING AFRAID AS IF SOMETHING AWFUL MIGHT HAPPEN: NOT AT ALL
IF YOU CHECKED OFF ANY PROBLEMS ON THIS QUESTIONNAIRE, HOW DIFFICULT HAVE THESE PROBLEMS MADE IT FOR YOU TO DO YOUR WORK, TAKE CARE OF THINGS AT HOME, OR GET ALONG WITH OTHER PEOPLE: NOT DIFFICULT AT ALL
6. BECOMING EASILY ANNOYED OR IRRITABLE: SEVERAL DAYS

## 2020-12-04 ENCOUNTER — HOSPITAL ENCOUNTER (OUTPATIENT)
Dept: CARDIOLOGY | Facility: CLINIC | Age: 50
Discharge: HOME | End: 2020-12-04
Attending: FAMILY MEDICINE

## 2020-12-04 DIAGNOSIS — R00.2 PALPITATIONS: ICD-10-CM

## 2021-01-21 ENCOUNTER — COMMUNICATION - HEALTHEAST (OUTPATIENT)
Dept: FAMILY MEDICINE | Facility: CLINIC | Age: 51
End: 2021-01-21

## 2021-01-22 ENCOUNTER — AMBULATORY - HEALTHEAST (OUTPATIENT)
Dept: FAMILY MEDICINE | Facility: CLINIC | Age: 51
End: 2021-01-22

## 2021-01-22 DIAGNOSIS — R00.2 PALPITATIONS: ICD-10-CM

## 2021-02-18 ENCOUNTER — HOSPITAL ENCOUNTER (OUTPATIENT)
Dept: CARDIOLOGY | Facility: HOSPITAL | Age: 51
Discharge: HOME | End: 2021-02-18
Attending: FAMILY MEDICINE

## 2021-02-18 DIAGNOSIS — R00.2 PALPITATIONS: ICD-10-CM

## 2021-03-19 ENCOUNTER — AMBULATORY - HEALTHEAST (OUTPATIENT)
Dept: NURSING | Facility: CLINIC | Age: 51
End: 2021-03-19

## 2021-04-09 ENCOUNTER — AMBULATORY - HEALTHEAST (OUTPATIENT)
Dept: NURSING | Facility: CLINIC | Age: 51
End: 2021-04-09

## 2021-04-30 ENCOUNTER — COMMUNICATION - HEALTHEAST (OUTPATIENT)
Dept: FAMILY MEDICINE | Facility: CLINIC | Age: 51
End: 2021-04-30

## 2021-04-30 DIAGNOSIS — F41.1 ANXIETY STATE: ICD-10-CM

## 2021-05-26 ASSESSMENT — PATIENT HEALTH QUESTIONNAIRE - PHQ9: SUM OF ALL RESPONSES TO PHQ QUESTIONS 1-9: 7

## 2021-05-28 ASSESSMENT — ANXIETY QUESTIONNAIRES
GAD7 TOTAL SCORE: 0
GAD7 TOTAL SCORE: 6

## 2021-05-29 ENCOUNTER — RECORDS - HEALTHEAST (OUTPATIENT)
Dept: ADMINISTRATIVE | Facility: CLINIC | Age: 51
End: 2021-05-29

## 2021-05-29 NOTE — TELEPHONE ENCOUNTER
Medication Request  Medication name:   - Synthroid 75 mcg - take one tablet every day except for Monday  - Synthroid 88 mcg - take one tablet on Mondays only  Pharmacy Name and Location: [facility] [place]  Reason for request: Patient stated Dr. Ghosh changed her dose.  When did you use medication last?:  Patient stated she is out of the 75 mcg and has been taking the 88 mcg every day.  Please ask a covering provider to send over the Rx.  If this cannot be handled by the covering provider, please contact patient and let her know.  Patient is aware Alicia Ghosh MD is not in until Monday.  Okay to leave a detailed message: yes  [phone]

## 2021-05-30 VITALS — BODY MASS INDEX: 21.2 KG/M2 | WEIGHT: 123.5 LBS

## 2021-05-30 VITALS — BODY MASS INDEX: 21.21 KG/M2 | WEIGHT: 123.56 LBS

## 2021-05-30 VITALS — WEIGHT: 124.4 LBS | BODY MASS INDEX: 21.24 KG/M2 | HEIGHT: 64 IN

## 2021-05-31 ENCOUNTER — RECORDS - HEALTHEAST (OUTPATIENT)
Dept: ADMINISTRATIVE | Facility: CLINIC | Age: 51
End: 2021-05-31

## 2021-05-31 VITALS — BODY MASS INDEX: 22.02 KG/M2 | HEIGHT: 64 IN | WEIGHT: 129 LBS

## 2021-05-31 VITALS — BODY MASS INDEX: 21.46 KG/M2 | WEIGHT: 125 LBS

## 2021-06-01 ENCOUNTER — RECORDS - HEALTHEAST (OUTPATIENT)
Dept: ADMINISTRATIVE | Facility: CLINIC | Age: 51
End: 2021-06-01

## 2021-06-01 VITALS — HEIGHT: 64 IN | WEIGHT: 123.2 LBS | BODY MASS INDEX: 21.03 KG/M2

## 2021-06-02 ENCOUNTER — RECORDS - HEALTHEAST (OUTPATIENT)
Dept: ADMINISTRATIVE | Facility: CLINIC | Age: 51
End: 2021-06-02

## 2021-06-02 VITALS — BODY MASS INDEX: 22.02 KG/M2 | HEIGHT: 64 IN | WEIGHT: 129 LBS

## 2021-06-02 VITALS — BODY MASS INDEX: 22.62 KG/M2 | WEIGHT: 131.8 LBS

## 2021-06-04 VITALS
RESPIRATION RATE: 16 BRPM | TEMPERATURE: 98.2 F | BODY MASS INDEX: 22.2 KG/M2 | WEIGHT: 130 LBS | SYSTOLIC BLOOD PRESSURE: 100 MMHG | HEIGHT: 64 IN | DIASTOLIC BLOOD PRESSURE: 64 MMHG | HEART RATE: 64 BPM

## 2021-06-04 VITALS
OXYGEN SATURATION: 97 % | DIASTOLIC BLOOD PRESSURE: 60 MMHG | TEMPERATURE: 97.6 F | HEIGHT: 64 IN | HEART RATE: 73 BPM | BODY MASS INDEX: 23.12 KG/M2 | SYSTOLIC BLOOD PRESSURE: 86 MMHG | WEIGHT: 135.4 LBS

## 2021-06-04 NOTE — TELEPHONE ENCOUNTER
Refill Approved    Rx renewed per Medication Renewal Policy. Medication was last renewed on 6/24/19.    Fanta Melo, [facility] Connection Triage/Med Refill 12/10/2019     Requested Prescriptions   Pending Prescriptions Disp Refills     SYNTHROID 75 mcg tablet [Pharmacy Med Name: SYNTHROID 75 MCG TABLET] 90 tablet 1     Sig: PLEASE SEE ATTACHED FOR DETAILED DIRECTIONS       Thyroid Hormones Protocol Passed - 12/10/2019  2:57 AM        Passed - Provider visit in past 12 months or next 3 months     Last office visit with prescriber/PCP: 3/6/2019 Alicia Ghosh MD OR same dept: 3/6/2019 Alicia Ghosh MD OR same specialty: 3/6/2019 Alicia Ghosh MD  Last physical: Visit date not found Last MTM visit: Visit date not found   Next visit within 3 mo: Visit date not found  Next physical within 3 mo: Visit date not found  Prescriber OR PCP: Alicia Ghosh MD  Last diagnosis associated with med order: 1. Hypothyroidism  - SYNTHROID 75 mcg tablet [Pharmacy Med Name: SYNTHROID 75 MCG TABLET]; PLEASE SEE ATTACHED FOR DETAILED DIRECTIONS  Dispense: 90 tablet; Refill: 1    If protocol passes may refill for 12 months if within 3 months of last provider visit (or a total of 15 months).             Passed - TSH on file in past 12 months for patient age 12 & older     TSH   Date Value Ref Range Status   12/26/2018 0.03 (L) 0.30 - 5.00 uIU/mL Final

## 2021-06-04 NOTE — TELEPHONE ENCOUNTER
Please let patient know that Dr. Ghosh is out of town for another week and will address this when she gets back.  I did order that thyroglobulin tumor marker test if she would like to have that done sooner.  I agree that her lab work came back normal and her T4 within normal range.  I do not think that her thyroid function is what is contributing to her weight gain as this was a concern when she saw weight noted back in March for similar concerns in her thyroid hormone was in normal range for her T4 and TSH was on the lower end which is where we usually keep it for her history of thyroid cancer.  If she is interested, we can refer her out for endocrinology outside of where she previously was going to discuss further-although sometimes can take up to a month or 2 to get into see endocrinology.  Could consider with Dr. Ghosh if Other medications could be causing her lethargy or weight gain such as the Lexapro.  We will let her address with her primary when she returns

## 2021-06-04 NOTE — TELEPHONE ENCOUNTER
Left message to call back for: Results  Information to relay to patient:  LMTCB. Please relay message below from Dr. Ghosh to patient. Result letter mailed to patient.

## 2021-06-04 NOTE — TELEPHONE ENCOUNTER
Patient states understanding and agree with plan.   Patient would like a new rx sent to the pharmacy. Rx pended.   patient would like to know.    Patient states that since Dr. Hernandez is left she would like to know what her markers levels are for the cancer. Patient would like to discuss this with pcp.   Please advise.

## 2021-06-04 NOTE — TELEPHONE ENCOUNTER
Patient Returning Call  Reason for call:  Return call   Information relayed to patient:  Caller is returning call, caller stated that she is confused from Alicia Ghosh MD message on regards to her Thyroid labs. Caller stated that she was told to decrease her medication to 75 mg. Caller stated that she was so confused she had contacted her pharmacist and they stated that if patient is Hypo her medications should be increasing instead of decreasing.   Patient has additional questions:  Yes  If YES, what are your questions/concerns:  Question on her Thyroid medication and explaining more on labs results.   Okay to leave a detailed message?: Yes

## 2021-06-04 NOTE — TELEPHONE ENCOUNTER
I prefer not to use reserved spots for physicals.  If she wishes a lab appointment to recheck her thyroid before her physical that is fine.  I will place that order.  Also I will update the chart with the current doses of levothyroxine and Sigg.

## 2021-06-04 NOTE — TELEPHONE ENCOUNTER
Patient scheduled for lab appointment tomorrow 12/18/19 at [place]. Tried assisting in setting up physical appointment, nothing available until April for a Monday or Thursday appointment. Patient is frustrated that she would need to wait that long and declined to schedule at this time. Offered other providers which she also declined.     Rx pended for 75 mcg dosing please send to pharmacy. Patient wants Rx jarvis as TATI.

## 2021-06-04 NOTE — TELEPHONE ENCOUNTER
Please call patient.  I believe she takes 75 mcg 5 days a week and 88 mcg 2 days a week but I am not sure as that is not documented in EHR.  Would you mind checking with the patient and then I can document it in EHR.  Thanks.

## 2021-06-04 NOTE — TELEPHONE ENCOUNTER
Medication Question or Clarification  Who is calling: Pharmacy: [facility]  What medication are you calling about?: levothyroxine  What dose do you take?: 75 mcg  How often are you taking the medication?: ?  Who prescribed the medication?: Augie  What is your question/concern?: sig needs to have full directions  Pharmacy: [facility]  Okay to leave a detailed message?: Yes  Site CMT - Please call the pharmacy to obtain any additional needed information.

## 2021-06-04 NOTE — TELEPHONE ENCOUNTER
Thyroid tumor markers were not done.  If she would like those done I can order them now or at her physical in the spring.  It probably would be a good idea to reestablish care with an endocrinologist and we could help her do that through the  [employer] system.  Let me know if she wishes to do that.  I will refill her levothyroxine at 75 mcg daily.

## 2021-06-04 NOTE — TELEPHONE ENCOUNTER
Left message to call back for: Results  Information to relay to patient:  LMTCB, Please relay message below from .

## 2021-06-04 NOTE — TELEPHONE ENCOUNTER
----- Message from Alicia Ghosh MD sent at 12/22/2019  9:25 PM CST -----  Please call patient then please mail results after calling.  TSH is too low meaning medication is too high.  I know with your history of thyroid cancer we want to keep you on the overactive side but this seems to be too much in my opinion.  Per our chart you are on levothyroxine 88 mcg 1 day a week and 75 mcg 6 days a week.  I would have you change that to taking 75 mcg daily.  Please let me know if you are agreeable and I will update this in your chart.  Please also let me know if you need a refill of the 75 mcg prescription.

## 2021-06-04 NOTE — TELEPHONE ENCOUNTER
Please call patient and ask her to have her last pap smear results fax to clinic.  PASQUALE may need to be mailed to patient.

## 2021-06-04 NOTE — TELEPHONE ENCOUNTER
Also, I updated the med list to reflect the dosing of 75 mcg daily except 88 on Monday.  If she needs refills please T that up and I am happy to cosign.  Thanks.

## 2021-06-04 NOTE — TELEPHONE ENCOUNTER
Note from 05/31/19 phone encounter- Synthroid 75 mcg - take one tablet every day except for Monday- Synthroid 88 mcg - take one tablet on Mondays only. Please update pt med list. CA will need to update pharmacy once changed.      Pt verified she has been taking 75 mcg 1 x weekly, 88 mcg all other days. Pt stated she no longer has an endocrinologist and would like to schedule a px with Dr Ghosh only. Pt reports recent fatigue and weight gain. No openings dec/ jan. Ok to add to a reserve spot?

## 2021-06-04 NOTE — TELEPHONE ENCOUNTER
Patient notified of message from Dr Pulido. She has lab appointment set up to have this test done 1/8/20. She will follow up with Dr Ghosh on 1/20/20 and will discuss further if referral to endocrinology is needed at that time.   She was questioning if Lexapro can cause these symptoms of weight gain and lethargy, notified that Dr Pulido had mentioned in her message to discuss further with Dr Ghosh regarding the possibility if other medications could be causing these symptoms.

## 2021-06-04 NOTE — TELEPHONE ENCOUNTER
Patient Returning Call  Reason for call:  Returning call from nurse  Information relayed to patient:  That TSH level low and was told she was hypothyroid so she's confused what the results are.   Patient has additional questions: yes  If YES, what are your questions/concerns:  What are the adjustments on medication and is it correct?  Okay to leave a detailed message?: yes

## 2021-06-04 NOTE — TELEPHONE ENCOUNTER
Please review and clarify pt thyroid lab results from 12/18/19, medication dose and new changes to the dose. Pt stated she feels very hypo having weight gain and very lethargic stating she thought if very low she needs a higher dose not lower. Pt is confused by pcp result note and just wants clarification-see below.  Pt also had requested her thyroid tumor marker be checked and was not. Please order future tumor marker lab, pt coming on 1/8 for lab only.  Per Dr Ghosh pt needed a px scheduled but no openings until April. Assisted pt in scheduling med check for 01/20 to discuss thyroid and a px for 4/20.        Notes recorded by Alicia Ghosh MD on 12/22/2019 at 9:25 PM CST  Please call patient then please mail results after calling.  TSH is too low meaning medication is too high.  I know with your history of thyroid cancer we want to keep you on the overactive side but this seems to be too much in my opinion.  Per our chart you are on levothyroxine 88 mcg 1 day a week and 75 mcg 6 days a week.  I would have you change that to taking 75 mcg daily.  Please let me know if you are agreeable and I will update this in your chart.  Please also let me know if you need a refill of the 75 mcg prescription.

## 2021-06-05 NOTE — PROGRESS NOTES
"Assessment:   1. Yeast infection of the vagina     2. Sicca syndrome (H)     3. Primary hypercoagulable state (H)     4. Thyroid cancer (H)  CANCELED: Tyroglobulin tumor marker - Misc. Lab Test   5. Other fatigue  Comprehensive Metabolic Panel    Vitamin B12    Vitamin D, Total (25-Hydroxy)    Lipid Cascade    Morphology, Path Smear Review (MORP)    Iron and Transferrin Iron Binding Capacity    Magnesium    Peripheral Blood Smear, Path Review   6. Hypothyroidism, unspecified type         Plan:  Physical set April 20 at 3:20PM. Will recheck thyroid at that time.    Stop taking the 88 mg Synthroid once a week. Start taking the 75 mg daily.    Endocrinology set around March.     Recommend healthy diet and exercise. Exercise 30 minutes a day.    Track calorie intake, which should be around 2000 calories a day. But if trying to loose weight, 1700 per day.    Continue Lexapro. Can talk to pharmasist about their concerns about taking Lexapro and Hydroxychloroquine together.    Following with rheumatology.     Buy a new over the counter Monistat. Can use Fluconazole, but have to be off the Hydroxychloroquine. Fluconazole, 1 pill today and 1 pill tomorrow.    I have had an Advance Directives discussion with the patient.      Subjective:  Chief Complaint   Patient presents with     Hypothyroidism     fasting, thyroid check      Emilia Prince, a 49 y.o. year old, comes in to clinic with complaints of hypothyroidism. Symptoms started a while ago. The symptoms are unchanged.  Aggravating factors are listed below. Alieviating factors are listed below.  Has tried nothing for relief. Associated symptoms are listed below.     Hyper/Hypothyroidism: The patient was told that her recent lab results were showing she was hyper rather than hypo for her thyroid. She says that she is experiencing hyperthyroidism symptoms rather than hypothyroidism. Additionally, the patient reports being fatigue, \"sluggish: and rapidly gaining weight. The " "patient says that she is not a \"great\" eater but her patterns have not changed. She does not like much things that are healthy to eat, but does eat a lot of carb.  She has never been an exerciser.    Menstrual period: Her last period was around 3 years ago. She has an IUD.  She says that she is experiencing  hot flashes.     Mood: She states that she is doing fine.     : The patient reports that she has a \"raging\" yeast infection at the moment. She is concerned that this may skew the results for lab work today.  She has been using some  Monistat that has not been helping.    Medication: She was recently told by a pharmacist that her Lexapro does not go well with hydroxychloroquine as it may cause heart issues. She has been taking these two medication together for over three years. She denies any shortness of breath or chest pain.     Review of Systems:   All other systems are negative.     PFSH:  She never exercises.  Her rheumatologist has retired and she just recently found a new one.         Current Outpatient Medications   Medication Sig     aspirin 81 MG EC tablet Take 81 mg by mouth daily.     escitalopram oxalate (LEXAPRO) 5 MG tablet Take 1 tablet (5 mg total) by mouth daily.     hydroxychloroquine (PLAQUENIL) 200 mg tablet Take 200 mg by mouth daily.     MULTIVITAMIN ORAL Take 1 tablet by mouth daily.     SYNTHROID 75 mcg tablet 1 tablet daily       Patient Active Problem List   Diagnosis     Lupus Anticoagulant     Acute Gastric Ulcer     Antiphospholipid Antibody Syndrome     Hypothyroidism     Thyroid cancer (H)     Sjogren's Syndrome     Atrial Premature Complex     Anxiety     Cyst of neck     Adenomatous colon polyp       ROS: No fevers, chills, chest pain, shortness of breath, joint pain, rash, lower extremity edema    Objective:  BP (!) 86/60 (Patient Site: Left Arm, Patient Position: Sitting, Cuff Size: Adult Regular)   Pulse 73   Temp 97.6  F (36.4  C) (Oral)   Ht 5' 4\" (1.626 m)   Wt " 135 lb 6.4 oz (61.4 kg)   SpO2 97%   BMI 23.24 kg/m    General: No apparent distress        ADDITIONAL HISTORY SUMMARIZED (2): None.  DECISION TO OBTAIN EXTRA INFORMATION (1): None.   RADIOLOGY TESTS (1): None.  LABS (1): Labs ordered today. 12/18/2019 Thyroid lab reviewed.   MEDICINE TESTS (1): None.  INDEPENDENT REVIEW (2 each): None.     The visit lasted a total of 35 minutes face to face with the patient. Over 50% of the time was spent counseling and educating the patient about the problems listed above.    I, Aleyda Adams am scribing for and in the presence of, Dr. Ghosh.    I, Dr. Ghosh, personally performed the services described in this documentation, as scribed by Aleyda Adams in my presence, and it is both accurate and complete.    Total data points: 1

## 2021-06-05 NOTE — PATIENT INSTRUCTIONS - HE
Physical set April 20 at 3:20PM. Will recheck thyroid at that time.    Stop taking the 88 mg Synthroid. Start taking the 75 mg daily.    Endocrinology set around March.     Recommend healthy diet and exercise. Exercise 30 minutes a day.    Track calorie intake, which should be around 2000 calories a day. But if trying to loose weight, 1700 per day.    Continue Lexapro. Can talk to pharmasist about their concerns about taking Lexapro and Hydroxychloroquine together.    Following with rheumatology.     Buy a new over the counter Monistat. Can use Fluconazole, but have to be off the Hydroxychloroquine. Fluconazole, 1 pill today and 1 pill tomorrow.    If you choose to use [email or web address] to send a provider a message please keep this very brief.  They should only be used for a follow-up questions to a previous appointment.  New concerns should addressed by a phone call to triage, E -visit or an office visit.  Certainly if it is an emergency call 911. Thank-you.    Your lab results will be communicated to you via letter, [employer] message or phone call when they are all back.  Please refrain from sending messages on one specific lab until they are all back.  Thank you.

## 2021-06-06 NOTE — TELEPHONE ENCOUNTER
If she is still traveling and needs this prescription I am happy to see her.  I am going to be in clinic all day next Wednesday.  Thank you.

## 2021-06-06 NOTE — TELEPHONE ENCOUNTER
"Who is calling:  The patient   Reason for Call:  Patient states \" I want you guys to look in my chart and you will see that Alicia Ghosh MD prescribed Lorazepam 3 years ago, patient states  I never filled it \". Writer advised the patient to schedule an appointment . The patient refuses to schedule. Writer advised of the controlled substance policy.  Date of last appointment with primary care: 1/20/20  Okay to leave a detailed message: Yes  Patient states she was going into a work meeting but a voice mail can be left.      "

## 2021-06-06 NOTE — TELEPHONE ENCOUNTER
Pt was prescribed this medication back in 2016 to use as needed for sleep or anxiety. Patient has never been prescribed Ativan or Lorazepam in the past.  Patient should still be seen as new controlled substance policy states. Please advise.      ALPRAZolam (XANAX) 0.5 MG tablet (Discontinued)  30 tablet  0  10/6/2016  2/15/2017  No    Sig - Route: Take 1 tablet (0.5 mg total) by mouth bedtime as needed for sleep or anxiety. - Oral    Notes to Pharmacy: 1/2 to 1 tab at bedtime as needed for anxiety    Reason for Discontinue: Therapy completed    E-Prescribing Status: Receipt confirmed by pharmacy (10/6/2016  4:29 PM CDT)    ALPRAZolam (XANAX) 0.5 MG tablet [[id number]]     Electronically signed by: Alicia Ghosh MD on 10/06/16 1628  Status: Discontinued    Ordering user:   Alicia Ghosh MD 10/06/16 1628  Authorized by: Alicia Ghosh MD    PRN reasons: sleep anxiety    Frequency: Bedtime PRN 10/06/16 - 365  days  Discontinued by: Alicia Ghosh MD 02/15/17 5943 [Therapy completed]    Medication comments: 1/2 to 1 tab at bedtime as needed for anxiety

## 2021-06-06 NOTE — TELEPHONE ENCOUNTER
Informed patient of providers message below. Stated since she has never been on this medication before, she cannot be prescribed without being seen seeing as it is a controlled substance. Patient stated that her mother had Lorazepam prescribed for flying and she does not agree with taking melatonin or benadryl for flying. Patient is requesting an appointment with Dr. Ghosh and if Dr. Ghosh cannot accommodate patient she is requesting an appointment with Dr. Pulido. Patient is leaving for vacation on 3/30. Please advise.

## 2021-06-06 NOTE — TELEPHONE ENCOUNTER
This is a controlled substance and I am not recommending that for travel.  If she needs something to calm her on the plane I would recommend Benadryl or melatonin.  Thank you.

## 2021-06-06 NOTE — TELEPHONE ENCOUNTER
Medication Request  Medication name: Lorazepam   Requested Pharmacy: [facility]  Reason for request: Travel   03-30-20  When did you use medication last?:  Never  Patient offered appointment:  yes  Okay to leave a detailed message: yes

## 2021-06-08 NOTE — PROGRESS NOTES
ASSESSMENT & PLAN:  1. Anxiety  Lipid Cascade    Glucose    Hemoglobin   2. Hypothyroidism  Lipid Cascade    Glucose    Hemoglobin    Thyroglobulin, Tumor Marker       Patient Instructions   Return in 6 months for next medication check.     Return for a fasting lab-only appointment.     Propranolol 10 mg and buspirone 7.5 mg as needed.    Lexapro 5 mg daily.     Flonase 2 sprays each nostril once daily for one month.     Sudafed if needed for decongestion.     Lab per endocrine.    In counseling.    Physicals at [facility].          Orders Placed This Encounter   Procedures     Lipid Cascade     Standing Status:   Future     Standing Expiration Date:   2/15/2018     Order Specific Question:   Fasting is required?     Answer:   Yes     Glucose     Standing Status:   Future     Standing Expiration Date:   2/15/2018     Hemoglobin     Standing Status:   Future     Standing Expiration Date:   2/15/2018     Medications Discontinued During This Encounter   Medication Reason     ALPRAZolam (XANAX) 0.5 MG tablet Therapy completed       Return in about 6 months (around 8/15/2017) for Next scheduled follow up.    CHIEF COMPLAINT:  Chief Complaint   Patient presents with     Follow-up     follow up med check       HISTORY OF PRESENT ILLNESS:  Emilia is a 46 y.o. female presenting to the clinic today for an anxiety follow up and medication check.     Anxiety: She has she is doing well overall, but she does have a lot of stress, especially regarding her son's mental health. Her MEGAN-7 today is 7, which is improved from previously. Her PHQ-9 score today is 7. She is seeing a mental health counselor. She started taking Propranolol 10 mg as needed for times of high stress, such as public speaking and stressful work meeting. She has not needed to take the propranolol for about one month. She takes lexapro 5 mg daily, and buspirone 7.5 mg as needed, about once per week. She did not like the alprazolam 0.5 mg and has discontinued taking  it.     Otitis Media: She is currently taking amoxicillin for a bilateral ear infection, right worse than left, and possible sinus infection. She is having difficulty hearing, especially while in meetings at work, because of the congestion in her ears. She says she can feel fluid in her ears whenever she moves her head. She has a history of sinus infections secondary to allergies.     Hypothyroidism: She was seen last week for a recheck of her TSH levels. She is supposed to return for tumor marker. In  she was seen by Dr. Hernandez for hyperthyroidism. On 16, her TSH level was 0.05 and her tumor marker levels were normal. She has had thyroid cancer in the past and her whole thyroid has been removed. She would like her TSH levels rechecked today.     REVIEW OF SYSTEMS:   All other systems are negative.    PFSH:  She has a history of a precancerous colon polyp. Her grandfather  of colon cancer. Her son is a senior in [employer] and is in a partial-hospitalization program for chronic anxiety and for depression. Her home is being remodeled, which is stressful for her.    TOBACCO USE:  History   Smoking Status     Never Smoker   Smokeless Tobacco     Never Used       VITALS:  Vitals:    02/15/17 1449   BP: (!) 88/60   Patient Site: Right Arm   Patient Position: Sitting   Cuff Size: Adult Regular   Pulse: 68   Resp: 16   Weight: 123 lb 9 oz (56 kg)     Wt Readings from Last 3 Encounters:   02/15/17 123 lb 9 oz (56 kg)   16 123 lb (55.8 kg)   10/11/16 121 lb 2 oz (54.9 kg)     Body mass index is 21.21 kg/(m^2).    PHYSICAL EXAM:  Ears: TM's pink and bulging bilaterally, with some dried blood on the right TM  Psych: Bright affect, holds good conversation, and does not appear anxious. No suicidal or homicidal ideation.     QUALITY MEASURES:  The following are part of a depression follow up plan for the patient:  mental health care assessment and mental health care management    ADDITIONAL HISTORY  SUMMARIZED (2): None.  DECISION TO OBTAIN EXTRA INFORMATION (1): None.   RADIOLOGY TESTS (1): None.  LABS (1): Reviewed labs from 12/23/16 and 5/12/15. Order labs today.   MEDICINE TESTS (1): None.  INDEPENDENT REVIEW (2 each): None.     The visit lasted a total of 33 minutes face to face with the patient. Over 50% of the time was spent counseling and educating the patient about anxiety, medication check, and hypothyroid.    ICintia, am scribing for and in the presence of, Dr. Ghosh.    I, Dr. Ghosh personally performed the services described in this documentation, as scribed by Cintia Olivas in my presence, and it is both accurate and complete.    MEDICATIONS:  Current Outpatient Prescriptions   Medication Sig Dispense Refill     aspirin 81 MG EC tablet Take 81 mg by mouth daily.       busPIRone (BUSPAR) 7.5 MG tablet TAKE 1 TABLET (7.5 MG TOTAL) BY MOUTH 2 (TWO) TIMES A DAY. (Patient taking differently: TAKE 1 TABLET (7.5 MG TOTAL) BY MOUTH 2 (TWO) TIMES A DAY.- PRN) 60 tablet 1     cholecalciferol, vitamin D3, (VITAMIN D3) 1,000 unit capsule Take 1,000 Units by mouth daily.       escitalopram oxalate (LEXAPRO) 5 MG tablet Take 5 mg by mouth daily.       hydroxychloroquine (PLAQUENIL) 200 mg tablet Take 200 mg by mouth daily.       MULTIVITAMIN ORAL Take 1 tablet by mouth daily.       propranolol (INDERAL) 10 MG tablet Take 1 tablet (10 mg total) by mouth 3 (three) times a day. (Patient taking differently: Take 10 mg by mouth as needed. ) 60 tablet 1     SYNTHROID 75 mcg tablet Take one tablet on Tues, Thurs, Sat and Sun, alternating with 88 mcg. 50 tablet 3     SYNTHROID 88 mcg tablet Take 1 tablet on Monday, Wed and Friday, alternating with 75 mcg. 40 tablet 3     fluticasone (FLONASE) 50 mcg/actuation nasal spray 2 sprays into each nostril daily. 16 g 2     OMEGA-3/DHA/EPA/FISH OIL (FISH OIL-OMEGA-3 FATTY ACIDS) 300-1,000 mg capsule Take 2 g by mouth daily.       No current facility-administered  medications for this visit.        Total data points: 1

## 2021-06-09 NOTE — TELEPHONE ENCOUNTER
New Appointment Needed  What is the reason for the visit:    Pt was scheduled in 4/7/2020 but cxcl'd due to covid, rescheduled for 8/6/20 but got a call that she needs to rescheduled again and the date 8/11 pt is out of town next available is then 10/22, was upset about her wait to reschedule and then that there is nothing available until 10/22 .  Provider Preference: PCP only  How soon do you need to be seen?: Sometime before 10/22/2020 or she will look at a different clinic and not [facility]  Waitlist offered?: No  Okay to leave a detailed message:  Yes

## 2021-06-09 NOTE — TELEPHONE ENCOUNTER
LMTCB. Please inform patient that Dr. Ghosh is doing virtual visits on 8/6/20. Pt can reschedule her physical to 8/21/20 at 10:40 AM if available.  Dr. Ghosh's schedule is full the week of 8/11/20.

## 2021-06-09 NOTE — PROGRESS NOTES
Assessment & Plan:  1. Sore throat  Strep negative, confirmatory test pending.  - Rapid Strep A Screen-Throat  - Group A Strep, RNA Direct Detection, Throat    2. Viral URI  Symptoms MOST consistent with viral upper respiratory infection.  Tonsillar swelling and sore throat should resolve as the virus resolves.  Discussed symptomatic management.    3. Acute middle ear effusion, right  Ongoing right middle ear effusion.  Patient states hearing is decreased and this has been present most likely since her previous ear infection about 3 weeks ago.  She has not tried anything at home to treat it.  We discussed several options and most likely the patient will you know use Flonase for about a month to see if we can get this to clear up.  Also discussed sparing use of Sudafed to dry up ear fluid can be helpful, but not to use regularly.  Overall this should resolve on its own in time, if it is not resolving over the next several weeks we could refer to ENT for further evaluation.      There are no Patient Instructions on file for this visit.    Orders Placed This Encounter   Procedures     Rapid Strep A Screen-Throat     Group A Strep, RNA Direct Detection, Throat     There are no discontinued medications.        Chief Complaint:   Chief Complaint   Patient presents with     Sore Throat     right tonsil is bigger than the other one, per pt. Pain x 2 days with ear pain        History of Present Illness:  Emilia is a 46 y.o. female presenting to the clinic today sore throat and right-sided ear pressure and pain.  Patient has had upper respiratory infections on and off for the past month or so.  She did recently have a double ear infection and sinus infection approximately 1 month ago that was treated with antibiotics.  Infection seemed to clear until she caught this most recent bug.  Her right side of her throat is sore and she looked in the mirror and saw tonsillar swelling.  Left ear is sore and feels like there is  pressure in the inner ear.  She has not had fever or chills.  She had a slight cough about a week ago but this is improved.  No sinus pressure or congestion or drainage..     Review of Systems:  All other systems are negative except as noted above.    PFSH:  Reviewed and updated.    Tobacco Use:  History   Smoking Status     Never Smoker   Smokeless Tobacco     Never Used       Vitals:  Vitals:    03/09/17 0909   BP: 110/60   Patient Site: Right Arm   Patient Position: Sitting   Cuff Size: Adult Regular   Resp: 14   Weight: 123 lb 8 oz (56 kg)     Wt Readings from Last 3 Encounters:   03/09/17 123 lb 8 oz (56 kg)   02/15/17 123 lb 9 oz (56 kg)   11/07/16 123 lb (55.8 kg)       Physical Exam:  Constitutional:  Reveals an alert, cooperative, 46-year-old female in no acute distress.  Vitals:  Per nursing notes.  Eyes: PERRLA, funduscopic exam within normal limits.  HENT: Right TM with large effusion, TM is slightly bulging.  Slightly able to see bony landmarks.  Left side TM is normal.,Oropharynx without erythema, right tonsil +2, left tonsil normal.  Posterior nasal drainage or thrush. Neck supple, slight right sided cervical anterior lymphadenopathy,  thyroid not palpable.  Cardiovascular:  Regular rate and rhythm without murmurs, rubs, or gallops. Carotids without bruits. Legs without edema.   Respiratory: Clear.  Respiratory effort normal.      Data Reviewed:  Additional History from Old Records or Another Person Summarized (2 total): None.     Decision to Obtain Extra information (1 total): None.     Radiology Tests Summarized and Ordered (XRAY/CT/MRI/DXA) (1 total): None.    Labs Reviewed and Ordered (1 total): None.    Medicine Tests Summarized and Ordered (EKG/ECHO/COLONOSCOPY/EGD) (1 total): None.    Independent Review of EKG or X-Ray (2 each): None.    The visit lasted a total of 25 minutes face to face with the patient. Over 50% of the time was spent counseling and educating the patient about plan of  care.    Medications:  Current Outpatient Prescriptions   Medication Sig Dispense Refill     aspirin 81 MG EC tablet Take 81 mg by mouth daily.       busPIRone (BUSPAR) 7.5 MG tablet TAKE 1 TABLET (7.5 MG TOTAL) BY MOUTH 2 (TWO) TIMES A DAY. 60 tablet 4     cholecalciferol, vitamin D3, (VITAMIN D3) 1,000 unit capsule Take 1,000 Units by mouth daily.       escitalopram oxalate (LEXAPRO) 5 MG tablet Take 5 mg by mouth daily.       hydroxychloroquine (PLAQUENIL) 200 mg tablet Take 200 mg by mouth daily.       MULTIVITAMIN ORAL Take 1 tablet by mouth daily.       propranolol (INDERAL) 10 MG tablet Take 1 tablet (10 mg total) by mouth 3 (three) times a day. (Patient taking differently: Take 10 mg by mouth as needed. ) 60 tablet 1     SYNTHROID 75 mcg tablet Take one tablet on Tues, Thurs, Sat and Sun, alternating with 88 mcg. 50 tablet 3     SYNTHROID 88 mcg tablet Take 1 tablet on Monday, Wed and Friday, alternating with 75 mcg. 40 tablet 3     fluticasone (FLONASE) 50 mcg/actuation nasal spray 2 sprays into each nostril daily. 16 g 2     OMEGA-3/DHA/EPA/FISH OIL (FISH OIL-OMEGA-3 FATTY ACIDS) 300-1,000 mg capsule Take 2 g by mouth daily.       No current facility-administered medications for this visit.        Total Data Points: JAZZ Salas, CNP    This note has been dictated using voice recognition software. Any grammatical or context distortions are unintentional and inherent to the software

## 2021-06-09 NOTE — TELEPHONE ENCOUNTER
----- Message from Brit Corral sent at 7/8/2020  9:06 AM CDT -----  Please help the  and call patient to schedule.    Brit  ----- Message -----  From: Alicia Ghosh MD  Sent: 7/7/2020   5:51 PM CDT  To: [facility] Scheduling Registration Pool    This patient is scheduled with me for a physical on Thursday, August 6.  I am virtual that week.  Please offer Tuesday, August 11 anytime that week for physical if she is available.  Thank you.  Juliet

## 2021-06-09 NOTE — TELEPHONE ENCOUNTER
Please apologize to the patient I have no control over when I am told I have to do virtual visits.  I would much rather see patients in person.  Please set her up for a physical if I have an opening.  You can use reserved spots if needed.

## 2021-06-09 NOTE — TELEPHONE ENCOUNTER
LMTCB pt is scheduled for physical on 8/6 provider is virtual that day and not in clinic. If we can move patients appointment to Tuesday 8/11 there is time available per note below from provider. Please assist patient in rescheduling appointment when she returns call.

## 2021-06-10 NOTE — PATIENT INSTRUCTIONS - HE
Sees dermatology routinely.      Ordered MRI of left lower leg to rule out mass because of the fullness and indentation.  Can call [phone] to set that up at [facility].    Sees endocrine 1-2 times a year.    Sees Rheumatology.    If you feel the eye discharge or crusting returns please notify us and we can send an antibiotic eyedrop.      Health Maintenance   Topic Date Due     PREVENTIVE CARE VISIT  Today     HIV SCREENING  NA     TD 18+ HE  03/31/2019, give today     MAMMOGRAM  04/02/2020, order     INFLUENZA VACCINE RULE BASED (1) Fall     ZOSTER VACCINES (1 of 2) If you are interested in the new shingles shot, Shingrix, please check with insurance for coverage either in clinic or at a pharmacy. It is a 2 shot series 2-6 months apart.      PAP SMEAR  At GYN     LIPID  Today     ADVANCE CARE PLANNING  Declined     COLORECTAL CANCER SCREENING  02/07/2023     TDAP ADULT ONE TIME DOSE  Completed     HEPATITIS B VACCINES  Declined     Dexa scan-Ordered

## 2021-06-10 NOTE — PROGRESS NOTES
Progress Note    Reason for Visit:  Chief Complaint     Thyroid Problem          Progress Note:    HPI:     This pleasant 47-year-old female patient is here for follow-up because of papillary thyroid cancer follicular variant.    Component      Latest Ref Rng & Units 2/15/2017 4/12/2017   Cholesterol      <=199 mg/dL  136   Triglycerides      <=149 mg/dL  53   HDL Cholesterol      >=50 mg/dL  56   LDL Calculated      <=129 mg/dL  69   Patient Fasting > 8hrs?        Yes   Thyroglobulin, Tumor Marker, S      ng/mL <0.1    Thyroglobulin Antibody, S      <4.0 IU/mL <1.8    Thyroglobulin Interpretation       SEE BELOW    TSH      0.30 - 5.00 uIU/mL     Free T4      0.7 - 1.8 ng/dL     T3, Total      45 - 175 ng/dL         Review of Systems:    Nervous System: No headache, dizziness, fainting or memory loss. No tingling sensation of hand or feet.  Ears: No hearing loss or ringing in the ears  Eyes: No blurring of vision, redness, itching or dryness.  Nose: No nosebleed or loss of smell  Mouth: No mouth sores or loss of taste  Throat: No hoarseness or difficulty swallowing  Neck: No enlarged thyroid or lymph nodes.  Heart: No chest pain, palpitation or irregular heartbeat. No swelling of hands or feet  Lungs: No shortness of breath, cough, night sweats, wheezing or hemoptysis.  Gastrointestinal: No nausea or vomiting, constipation or diarrhea.  No acid reflux, abdominal pain or blood in stools.  Kidney/Bladdr: No polyuria, polydipsia, nocturia or hematuria.  Genital/Sexual: No loss of libido  Skin: No rash, hair loss or hirsutism.  No abnormal striae  Muscles/Joints/Bones: No morning stiffness, muscle aches and pain or loss of height.    Current Medications:  Current Outpatient Prescriptions   Medication Sig     aspirin 81 MG EC tablet Take 81 mg by mouth daily.     busPIRone (BUSPAR) 7.5 MG tablet TAKE 1 TABLET (7.5 MG TOTAL) BY MOUTH 2 (TWO) TIMES A DAY.     cholecalciferol, vitamin D3, (VITAMIN D3) 1,000 unit capsule  Take 1,000 Units by mouth daily.     escitalopram oxalate (LEXAPRO) 5 MG tablet Take 5 mg by mouth daily.     fluticasone (FLONASE) 50 mcg/actuation nasal spray 2 sprays into each nostril daily.     hydroxychloroquine (PLAQUENIL) 200 mg tablet Take 200 mg by mouth daily.     MULTIVITAMIN ORAL Take 1 tablet by mouth daily.     OMEGA-3/DHA/EPA/FISH OIL (FISH OIL-OMEGA-3 FATTY ACIDS) 300-1,000 mg capsule Take 2 g by mouth daily.     propranolol (INDERAL) 10 MG tablet Take 1 tablet (10 mg total) by mouth 3 (three) times a day. (Patient taking differently: Take 10 mg by mouth as needed. )     SYNTHROID 75 mcg tablet Take one tablet on Tues, Thurs, Sat and Sun, alternating with 88 mcg. (Patient taking differently: 75 mcg daily. )       Patients Active Problems:  Patient Active Problem List   Diagnosis     Lupus Anticoagulant     Acute Gastric Ulcer     Antiphospholipid Antibody Syndrome     Hypothyroidism     Sjogren's Syndrome     Atrial Premature Complex     Anxiety     Cyst of neck       History:   reports that she has never smoked. She has never used smokeless tobacco.   reports that she has never smoked. She has never used smokeless tobacco. Her alcohol and drug histories are not on file.  History   Smoking Status     Never Smoker   Smokeless Tobacco     Never Used      reports that she has never smoked. She has never used smokeless tobacco. Her alcohol and drug histories are not on file.  History   Sexual Activity     Sexual activity: Not on file     Past Medical History:   Diagnosis Date     Benign Adenomatous Polyp Of The Large Intestine     Created by Conversion      Benign paroxysmal positional vertigo     Created by Conversion      Hx of radiation therapy     thyroid     Palpitations     Created by Conversion      Papillary Carcinoma Of The Thyroid Gland     Created by Conversion      Family History   Problem Relation Age of Onset     Cancer Father      Colon cancer Maternal Grandmother      Depression Son       "Anxiety disorder Son      Colon cancer Paternal Grandfather      Past Medical History:   Diagnosis Date     Benign Adenomatous Polyp Of The Large Intestine     Created by Conversion      Benign paroxysmal positional vertigo     Created by Conversion      Hx of radiation therapy     thyroid     Palpitations     Created by Conversion      Papillary Carcinoma Of The Thyroid Gland     Created by Conversion      Past Surgical History:   Procedure Laterality Date     OK THYROIDECTOMY      Description: Thyroid Surgery Total Thyroidectomy;  Recorded: 02/07/2012;       Vitals   height is 5' 4\" (1.626 m) and weight is 124 lb 6.4 oz (56.4 kg). Her blood pressure is 112/60.         Exam  General appearance: The patient looked well, not in acute distress.  Eyes: no evidence of thyroid eye disease.   Retinal exam: No evidence of diabetic retinopathy.  Mouth and Throat: Normal  Neck: No evidence of thyromegaly, enlarged lymph node or tenderness  Chest: Trachea is central. Chest is clear to auscultation and percussion. Breat sounds are normal.  Cardiovascular exam: JVP is not raised. Heart sounds are normal, no murmurs or rub  Peripheral pulses are palpable.   Abdomen: No masses or tenderness.    Back: No vertebral tenderness or kyphosis.  Extremities: No evidence of leg edema.   Skin: Normal to touch.  No abnormal striae  Neurologic exam:  Visual fields are intact by confrontation, grossly intact. No evidence of peripheral neuropathy.  Detailed foot exam normal.        Diagnosis:  No diagnosis found.    Orders:   No orders of the defined types were placed in this encounter.        Assessment and Plan:  Thyroid cancer papillary follicular variant.    The patient was taking 88  g of Synthroid.  She was jittery and having palpitation will gradually decrease the dose she is currently taking 75  g these symptoms has improved but she feels some tiredness.    We'll check her thyroid function test today and continue on the same " dose.    Loss TSH is 0.05.    She had her thyroid cancer back in 2007 received radioactive iodine treatment of 50 mCi we'll keep the TSH between 0.1-0.5.    Thyroid exam is normal thyroid lobe and chew more markers not detectable which is excellent prognosis.    She had antiphospholipid syndrome.  She takes aspirin.      She takes vitamin D 2000 units a day.  Plaquenil.    Patient will return to clinic in 6 months I did spent 40 minutes with the patient more than 50% was spent on counseling and managing her care.

## 2021-06-10 NOTE — PROGRESS NOTES
Assessment/Plan:  1. Routine general medical examination at a health care facility  Lipid [place]   2. Dysplastic nevi     3. Adenomatous polyp of colon, unspecified part of colon     4. Conductive hearing loss of right ear with unrestricted hearing of left ear     5. Visit for screening mammogram  Mammo Screening Bilateral   6. Hypothyroidism, unspecified type     7. Thyroid cancer (H)  Thyroglobulin Antibody    Thyroglobulin, Serum or Plasma with Reflex to LC-MS/MS or DIVYA    Comprehensive Metabolic Panel   8. Mass of left lower extremity  MR Tibia Fibula Without Contrast Left   9. Menopause  DXA Bone Density Scan     Sees dermatology routinely.      Ordered MRI of left lower leg to rule out mass because of the fullness and indentation.  Can call [phone] to set that up at [facility].    Sees endocrine 1-2 times a year.    Sees Rheumatology.    If you feel the eye discharge or crusting returns please notify us and we can send an antibiotic eyedrop.    Patient is a 50 y.o. female here for physical exam. See health maintenance section below. Labs as ordered.        HPI    Chief Complaint   Patient presents with     Annual Exam     pt is not fasting      Left leg indentaiton :  Noticed over last few months.  Just a few inches below the knee on the anterior left leg, she has noticed an indentation and possible swelling below that.  That has not gone away.    Right eye conjunctivitis: 2 days ago patient noticed some redness of the eye as well as pussy discharge and crusting.  It lasted until yesterday and is gone today.    History of thyroid cancer: She does follow with endocrinology and would like some thyroglobulin labs checked today.    Sjogren's syndrome: she does see rheumatology.    Health Maintenance   Topic Date Due     PREVENTIVE CARE VISIT  Today     HIV SCREENING  NA     TD 18+ HE  03/31/2019,      MAMMOGRAM  04/02/2020, order     INFLUENZA VACCINE RULE BASED (1) Fall     ZOSTER VACCINES (1 of 2) If you  "are interested in the new shingles shot, Shingrix, please check with insurance for coverage either in clinic or at a pharmacy. It is a 2 shot series 2-6 months apart.      PAP SMEAR  At GYN     LIPID  Today     ADVANCE CARE PLANNING  Declined     COLORECTAL CANCER SCREENING  02/07/2023     TDAP ADULT ONE TIME DOSE  Completed     HEPATITIS B VACCINES  Declined     Dexa scan-Ordered     Patient Active Problem List   Diagnosis     Lupus Anticoagulant     Acute Gastric Ulcer     Antiphospholipid Antibody Syndrome     Hypothyroidism     Thyroid cancer (H)     Sjogren's Syndrome     Atrial Premature Complex     Anxiety     Cyst of neck     Adenomatous colon polyp     Current Outpatient Medications   Medication Sig     aspirin 81 MG EC tablet Take 81 mg by mouth daily.     docosahexaenoic acid-epa 120-180 mg cap Take 1 g by mouth.     escitalopram oxalate (LEXAPRO) 5 MG tablet TAKE 1 TABLET BY MOUTH EVERY DAY     hydroxychloroquine (PLAQUENIL) 200 mg tablet Take 200 mg by mouth daily.     MULTIVITAMIN ORAL Take 1 tablet by mouth daily.     SYNTHROID 75 mcg tablet 1 tablet daily       Patient is a 50 y.o. female presents for a physical exam.    The following portions of the patient's history were reviewed and updated as appropriate: past medical history, past social history, past surgical history and problem list.    Review of Systems  Pertinent items are noted in HPI.  Immunization History   Administered Date(s) Administered     Td, Adult, Absorbed 11/30/1997     Td,adult,historic,unspecified 11/30/1997     Tdap 03/31/2009     No results found for this or any previous visit (from the past 240 hour(s)).  I have had an Advance Directives discussion with the patient.  Objective:    /64 (Patient Site: Left Arm, Patient Position: Sitting, Cuff Size: Adult Regular)   Pulse 64   Temp 98.2  F (36.8  C) (Oral)   Resp 16   Ht 5' 4\" (1.626 m)   Wt 130 lb (59 kg)   BMI 22.31 kg/m        General Appearance:    Alert, " cooperative, no distress, appears stated age   Head:    Normocephalic, without obvious abnormality, atraumatic   Eyes:    PERRL, conjunctiva/corneas clear, EOM's intact, fundi     benign, both eyes   Ears:    Normal TM's and external ear canals, both ears   Nose:   Nares normal, septum midline, mucosa normal, no drainage    or sinus tenderness   Throat:   Lips, mucosa, and tongue normal; teeth and gums normal   Neck:   Supple, symmetrical, trachea midline, no adenopathy;     thyroid:  no enlargement/tenderness/nodules; no carotid    bruit or JVD   Back:     Symmetric, no curvature, ROM normal, no CVA tenderness   Lungs:     Clear to auscultation bilaterally, respirations unlabored   Chest Wall:    No tenderness or deformity    Heart:    Regular rate and rhythm, S1 and S2 normal, no murmur, rub   or gallop   Breast Exam:    No tenderness, masses, or nipple abnormality   Abdomen:     Soft, non-tender, bowel sounds active all four quadrants,     no masses, no organomegaly           Extremities:   Extremities normal, atraumatic, no cyanosis or edema, just about 3 inches distal to the patella in the left anterior tibial region there is an indentation and just distal to that a fullness or mass   Pulses:   2+ and symmetric all extremities   Skin:   Skin color, texture, turgor normal, no rashes or lesions   Lymph nodes:   Cervical, supraclavicular, and axillary nodes normal   Neurologic:   CNII-XII intact, normal strength, sensation and reflexes     throughout

## 2021-06-11 NOTE — TELEPHONE ENCOUNTER
----- Message from Alicia Ghosh MD sent at 9/1/2020  8:20 PM CDT -----  Please call patient.  Normal lower leg MRI.  Please let me know if the lump or dent gets bigger.

## 2021-06-13 NOTE — PROGRESS NOTES
ASSESSMENT & PLAN:  1. Screening for breast cancer     2. Abdominal pain  Comprehensive Metabolic Panel    HM2(CBC w/o Differential)    US Abdomen Complete    US Pelvis With Transvaginal Non OB   3. Fatigue  Comprehensive Metabolic Panel    HM2(CBC w/o Differential)    Vitamin D, Total (25-Hydroxy)   4. Adenomatous colon polyp  Ambulatory referral for Colonoscopy   5. Encounter for screening mammogram for malignant neoplasm of breast  Mammo Screening Bilateral       Patient Instructions   Labs as ordered.     Please schedule your appointment with endocrinology.     Colonoscopy is due in February 2018.  Sooner if abdominal symptoms return.     Ultrasound of pelvis and abdomen was ordered.     Mammogram ordered.     Orders Placed This Encounter   Procedures     US Abdomen Complete     Standing Status:   Future     Standing Expiration Date:   10/27/2018     Scheduling Instructions:      SCHEDULING: Will patient schedule appointment? Yes     LOCATION: [place]            LOCATIONS:        - [facility] PHONE: [phone]      - [facility] PHONE: [phone]     Order Specific Question:   Is the patient pregnant?     Answer:   No     Order Specific Question:   Can the procedure be changed per Radiologist protocol?     Answer:   Yes     US Pelvis With Transvaginal Non OB     Standing Status:   Future     Standing Expiration Date:   10/26/2018     Order Specific Question:   Is the patient pregnant?     Answer:   No     Order Specific Question:   Can the procedure be changed per Radiologist protocol?     Answer:   Yes     Mammo Screening Bilateral     Standing Status:   Future     Standing Expiration Date:   10/27/2018     Scheduling Instructions:      PATIENT WILL SCHEDULE?  Yes     Order Specific Question:   Reason for Exam (Describe Symptoms):     Answer:   Screening mammogram, 3-D please     Order Specific Question:   Is the patient pregnant?     Answer:   No     Order  Specific Question:   Can the procedure be changed per Radiologist protocol?     Answer:   Yes     Comprehensive Metabolic Panel     HM2(CBC w/o Differential)     Vitamin D, Total (25-Hydroxy)     Ambulatory referral for Colonoscopy     Referral Priority:   Routine     Referral Type:   Colonoscopy     Referral Reason:   Evaluation and Treatment     Requested Specialty:   Gastroenterology     Number of Visits Requested:   1     Medications Discontinued During This Encounter   Medication Reason     fluticasone (FLONASE) 50 mcg/actuation nasal spray      OMEGA-3/DHA/EPA/FISH OIL (FISH OIL-OMEGA-3 FATTY ACIDS) 300-1,000 mg capsule        No Follow-up on file.    CHIEF COMPLAINT:  Chief Complaint   Patient presents with     Medication Management     anxiety       HISTORY OF PRESENT ILLNESS:  Emilia is a 47 y.o. female presenting to the clinic today medication follow-up.    She reports that she has flank pain bilaterally.  She indicates that it is worse when she drinks alcohol, and that it gets better when she drinks a lot of water.   She also indicates that while at a family member's house, she had sudden left sided abdominal pain that persisted for about 3 days.     Anxiety:  She indicates that she has many stressors, including a busy work schedule, stresses of parenting with her son's mental health concerns. She states that she is not anxiety free, but feels that she is managing well with her stressors.  She has not had any new stressors.    Fatigue: She states that she has started taking Vitamin D, but she is extremely fatigued.  She has been feeling very tired for the past month.  She sleeps well, but cannot get up in the morning.  She sleeps 7-8 hours per night on the weekdays, and 9-10 hours on the weekend.  She hits the snooze button for about an hour per night.  She indicates that her  has never reported that she snores or has trouble breathing.  She does not wake up with dry mouth. She reports that when  she sleeps 9-10 hours on the weekend she does not feel fatigued.     Hypothyroidism and History of Papillary carcinoma of the thyroid gland: She sees an endocrinologist for follow-up on this condition.    REVIEW OF SYSTEMS:   All other systems are negative.    PFSH:  She has a past history of thyroid cancer.  Her paternal grand father  of colon cancer.    TOBACCO USE:  History   Smoking Status     Never Smoker   Smokeless Tobacco     Never Used       VITALS:  Vitals:    10/26/17 1151   BP: 100/62   Patient Site: Right Arm   Patient Position: Sitting   Cuff Size: Adult Regular   Pulse: (!) 58   Resp: 12   Temp: 98.7  F (37.1  C)   TempSrc: Oral   Weight: 125 lb (56.7 kg)     Wt Readings from Last 3 Encounters:   10/26/17 125 lb (56.7 kg)   17 124 lb 6.4 oz (56.4 kg)   17 123 lb 8 oz (56 kg)     Body mass index is 21.46 kg/(m^2).    PHYSICAL EXAM:  General Appearance:    Alert, cooperative, no distress, appears stated age   Head:    Normocephalic, without obvious abnormality, atraumatic   Back:     Symmetric, no curvature, ROM normal, no CVA tenderness   Lungs:     Clear to auscultation bilaterally, respirations unlabored   Chest Wall:    No tenderness or deformity    Heart:    Regular rate and rhythm, S1 and S2 normal, no murmur, rub   or gallop   Abdomen:     Soft, bowel sounds active all four quadrants,     no masses, no organomegaly.        ADDITIONAL HISTORY SUMMARIZED (2): Reviewed note from endocrinology. Reviewed colonoscopy report from .   DECISION TO OBTAIN EXTRA INFORMATION (1): None.   RADIOLOGY TESTS (1): Mammogram order.   LABS (1): Labs ordered.  MEDICINE TESTS (1): Colonoscopy order  INDEPENDENT REVIEW (2 each): None.     The visit lasted a total of 40 minutes face to face with the patient. Over 50% of the time was spent counseling and educating the patient about health maintenance.    Glenda BARTLETT, am scribing for and in the presence of, Dr. Alicia Ghosh.    Dr. Alicia BARTLETT  FLORECITA Ghosh MD, personally performed the services described in this documentation, as scribed by Glenda Hebert in my presence, and it is both accurate and complete.    MEDICATIONS:  Current Outpatient Prescriptions   Medication Sig Dispense Refill     aspirin 81 MG EC tablet Take 81 mg by mouth daily.       busPIRone (BUSPAR) 7.5 MG tablet TAKE 1 TABLET (7.5 MG TOTAL) BY MOUTH 2 (TWO) TIMES A DAY. 60 tablet 4     cholecalciferol, vitamin D3, (VITAMIN D3) 1,000 unit capsule Take 1,000 Units by mouth daily.       escitalopram oxalate (LEXAPRO) 5 MG tablet Take 5 mg by mouth daily.       escitalopram oxalate (LEXAPRO) 5 MG tablet TAKE 1 TABLET (5 MG TOTAL) BY MOUTH DAILY. DUE FOR FOLLOW UP VISIT IN AUGUST. 30 tablet 2     hydroxychloroquine (PLAQUENIL) 200 mg tablet Take 200 mg by mouth daily.       MULTIVITAMIN ORAL Take 1 tablet by mouth daily.       propranolol (INDERAL) 10 MG tablet Take 1 tablet (10 mg total) by mouth 3 (three) times a day. (Patient taking differently: Take 10 mg by mouth as needed. ) 60 tablet 1     SYNTHROID 75 mcg tablet Take 1 tablet (75 mcg total) by mouth daily. 90 tablet 1     No current facility-administered medications for this visit.        Total data points: 5

## 2021-06-13 NOTE — PATIENT INSTRUCTIONS - HE
Recommend 3 dairy servings a day or calcium with vitamin D twice a day with food.    Ordered event monitor for 30 or more days to catch an event.    911 if any chest pain or chest pressure or severe shortness of breath.    To ER if persistent fast or irregular heart rate.    Recommend flu shot, she will think about this.

## 2021-06-13 NOTE — PROGRESS NOTES
"Emilia Prince is a 50 y.o. female who is being evaluated via a billable video visit.      The patient has been notified of following:     \"This video visit will be conducted via a call between you and your physician/provider. We have found that certain health care needs can be provided without the need for an in-person physical exam.  This service lets us provide the care you need with a video conversation.  If a prescription is necessary we can send it directly to your pharmacy.  If lab work is needed we can place an order for that and you can then stop by our lab to have the test done at a later time.    Video visits are billed at different rates depending on your insurance coverage. Please reach out to your insurance provider with any questions.    If during the course of the call the physician/provider feels a video visit is not appropriate, you will not be charged for this service.\"    Patient has given verbal consent to a Video visit? Yes  How would you like to obtain your AVS? AVS Preference: Mail a copy.  If dropped by the video visit, the video invitation should be sent to: Text to cell phone: [phone]  Will anyone else be joining your video visit? No        Video Start Time: 10:35 AM    Additional provider notes:   Chief Complaint   Patient presents with     Palpitations     has had palpatations for years      Test Results     would like to review DEXA scan results      Heart palpitations:  Symptoms for years.  Had 24 hour monitors a couple of times.  Did not have symptoms with the monitor on.  Looking at at home options.  Couple episodes in last couple of months, each lasted hours on an off.  Each episode would last 30 minutes and on and off for hours.  Would fluculate with heavy heart beats and cold feels whooshing in her head. Did not feel well with this.  She would feel very fast irregular heart rate.  No chest pain or chest pressure.  No shortness of breath.  Will feel foggy in in her head when " it happening. Has not passed out.  Last episode could not get back into normal rhythm even with coughing.  On and off for hours.   Feels irregual heartbeat with big thumps and feel thorught body.  In the past, did not last as long as recent episode, maybe 15 minutes. Cannot reset heart with coughing.     Holter 2009 and 2012-will get results-PDF not able to open in archive  Cardiology consult 2014 noted that she was having PACs and no treatment needed at that time.    Dexa scan:  50 y.o. female with LOW BONE DENSITY (OSTEOPENIA) and LOW fracture risk, adjusted for the TBS, with major osteoporotic fracture risk 3.8% and hip fracture risk 0.2%.      Recommendations:  Appropriate calcium, vitamin D supplements, along with balance and weight bearing exercise recommended with follow up bone density scan in 3 years.     Objective:  Appears well on video    Assessment / Plan:  1. Palpitations  JOHNIE Monitor Hook-Up     Recommend 3 dairy servings a day or calcium with vitamin D twice a day with food.    Ordered event monitor for 30 or more days to catch an event.    911 if any chest pain or chest pressure or severe shortness of breath.    To ER if persistent fast or irregular heart rate.    Recommend flu shot, she will think about this.    Video-Visit Details    Type of service:  Video Visit    Video End Time (time video stopped): 11:04 AM  Originating Location (pt. Location): Home    Distant Location (provider location):  [facility]     Platform used for Video Visit: Eunice Ghosh MD

## 2021-06-15 NOTE — PROGRESS NOTES
Progress Note    Reason for Visit:  Chief Complaint     Thyroid Problem          Progress Note:    HPI:    This pleasant 47-year-old female patient is here for follow-up because of papillary thyroid cancer she had that back in 2007 and received 50 mCi of radioactive iodine.      Component      Latest Ref Rng & Units 4/14/2017 9/13/2017 10/26/2017   AST      0 - 40 U/L   18   ALT      0 - 45 U/L   14   Thyroglobulin, Tumor Marker, S      ng/mL  <0.1    Thyroglobulin Antibody, S      <4.0 IU/mL  <1.8    Thyroglobulin Interpretation        SEE BELOW    Vitamin D, Total (25-Hydroxy)      30.0 - 80.0 ng/mL   53.2   TSH      0.30 - 5.00 uIU/mL 0.17 (L) 0.74    T3, Total      45 - 175 ng/dL 82 68    Free T4      0.7 - 1.8 ng/dL 1.1 1.0        Review of Systems:    Nervous System: No headache, dizziness, fainting or memory loss. No tingling sensation of hand or feet.  Ears: No hearing loss or ringing in the ears  Eyes: No blurring of vision, redness, itching or dryness.  Nose: No nosebleed or loss of smell  Mouth: No mouth sores or loss of taste  Throat: No hoarseness or difficulty swallowing  Neck: No enlarged thyroid or lymph nodes.  Heart: No chest pain, palpitation or irregular heartbeat. No swelling of hands or feet  Lungs: No shortness of breath, cough, night sweats, wheezing or hemoptysis.  Gastrointestinal: No nausea or vomiting, constipation or diarrhea.  No acid reflux, abdominal pain or blood in stools.  Kidney/Bladdr: No polyuria, polydipsia, nocturia or hematuria.  Genital/Sexual: No loss of libido  Skin: No rash, hair loss or hirsutism.  No abnormal striae  Muscles/Joints/Bones: No morning stiffness, muscle aches and pain or loss of height.    Current Medications:  Current Outpatient Prescriptions   Medication Sig     aspirin 81 MG EC tablet Take 81 mg by mouth daily.     cholecalciferol, vitamin D3, (VITAMIN D3) 1,000 unit capsule Take 1,000 Units by mouth daily.     escitalopram oxalate (LEXAPRO) 5 MG tablet  Take 5 mg by mouth daily.     hydroxychloroquine (PLAQUENIL) 200 mg tablet Take 200 mg by mouth daily.     MULTIVITAMIN ORAL Take 1 tablet by mouth daily.     SYNTHROID 75 mcg tablet Take 1 tablet (75 mcg total) by mouth daily.       Patients Active Problems:  Patient Active Problem List   Diagnosis     Lupus Anticoagulant     Acute Gastric Ulcer     Antiphospholipid Antibody Syndrome     Hypothyroidism     Sjogren's Syndrome     Atrial Premature Complex     Anxiety     Cyst of neck     Adenomatous colon polyp       History:   reports that she has never smoked. She has never used smokeless tobacco.   reports that she has never smoked. She has never used smokeless tobacco. Her alcohol and drug histories are not on file.  History   Smoking Status     Never Smoker   Smokeless Tobacco     Never Used      reports that she has never smoked. She has never used smokeless tobacco. Her alcohol and drug histories are not on file.  History   Sexual Activity     Sexual activity: Not on file     Past Medical History:   Diagnosis Date     Benign Adenomatous Polyp Of The Large Intestine     Created by Conversion      Benign paroxysmal positional vertigo     Created by Conversion      Hx of radiation therapy     thyroid     Palpitations     Created by Conversion      Papillary Carcinoma Of The Thyroid Gland     Created by Conversion      Family History   Problem Relation Age of Onset     Cancer Father      Colon cancer Maternal Grandmother      Depression Son      Anxiety disorder Son      Colon cancer Paternal Grandfather      Past Medical History:   Diagnosis Date     Benign Adenomatous Polyp Of The Large Intestine     Created by Conversion      Benign paroxysmal positional vertigo     Created by Conversion      Hx of radiation therapy     thyroid     Palpitations     Created by Conversion      Papillary Carcinoma Of The Thyroid Gland     Created by Conversion      Past Surgical History:   Procedure Laterality Date     [place]  "THYROIDECTOMY      Description: Thyroid Surgery Total Thyroidectomy;  Recorded: 02/07/2012;       Vitals   height is 5' 4\" (1.626 m) and weight is 129 lb (58.5 kg). Her blood pressure is 98/62.         Exam  General appearance: The patient looked well, not in acute distress.  Eyes: no evidence of thyroid eye disease.   Retinal exam: No evidence of diabetic retinopathy.  Mouth and Throat: Normal  Neck: No evidence of thyromegaly, enlarged lymph node or tenderness  Chest: Trachea is central. Chest is clear to auscultation and percussion. Breat sounds are normal.  Cardiovascular exam: JVP is not raised. Heart sounds are normal, no murmurs or rub  Peripheral pulses are palpable.   Abdomen: No masses or tenderness.    Back: No vertebral tenderness or kyphosis.  Extremities: No evidence of leg edema.   Skin: Normal to touch.  No abnormal striae  Neurologic exam:  Visual fields are intact by confrontation, grossly intact. No evidence of peripheral neuropathy.  Detailed foot exam normal.        Diagnosis:  No diagnosis found.    Orders:   No orders of the defined types were placed in this encounter.        Assessment and Plan: Papillary thyroid cancer the thyroid globin tumor marker is undetectable which is very good prognosis.    The patient was jittery on 88 mcg of Synthroid on 75 mcg she is feeling fatigued and tired with increased weight.    She takes the brand name I did advise her to take 88 mcg on Monday Wednesday Friday the rest of the days to take 75 mcg.    She takes 1000 units of vitamin D daily her vitamin D is 53.    She had thyroid ultrasound back in 2015 that was clear thyroid exam is normal we will follow-up by physical exam    She will return to clinic in 6 month I did spend 40 minutes with the patient more than 50% was spent on counseling and managing her care.    "

## 2021-06-17 NOTE — TELEPHONE ENCOUNTER
Refill Approved    Rx renewed per Medication Renewal Policy. Medication was last renewed on 3/19/20, last OV 11/30/20.    Ivone King, [facility] Triage/Med Refill 5/1/2021     Requested Prescriptions   Pending Prescriptions Disp Refills     escitalopram oxalate (LEXAPRO) 5 MG tablet [Pharmacy Med Name: ESCITALOPRAM 5 MG TABLET] 90 tablet 3     Sig: TAKE 1 TABLET BY MOUTH EVERY DAY       [facility] Refill Protocol  Passed - 4/30/2021 12:25 AM        Passed - PCP or prescribing provider visit in last year     Last office visit with prescriber/PCP: 1/20/2020 Alicia Ghosh MD OR same dept: Visit date not found OR same specialty: 1/20/2020 Alicia Ghosh MD  Last physical: 8/21/2020 Last MTM visit: Visit date not found   Next visit within 3 mo: Visit date not found  Next physical within 3 mo: Visit date not found  Prescriber OR PCP: Alicia Ghosh MD  Last diagnosis associated with med order: 1. Anxiety  - escitalopram oxalate (LEXAPRO) 5 MG tablet [Pharmacy Med Name: ESCITALOPRAM 5 MG TABLET]; TAKE 1 TABLET BY MOUTH EVERY DAY  Dispense: 90 tablet; Refill: 3    If protocol passes may refill for 12 months if within 3 months of last provider visit (or a total of 15 months).

## 2021-06-18 NOTE — LETTER
Letter by Alicia Ghosh MD at      Author: Alicia Ghosh MD Service: -- Author Type: --    Filed:  Encounter Date: 3/7/2019 Status: (Other)       Emilia Prince  [address] [facility] [address]             March 7, 2019         Dear Ms. Prince,    Below are the results from your recent visit:    Resulted Orders   Lipid Cascade   Result Value Ref Range    Cholesterol 141 <=199 mg/dL    Triglycerides 137 <=149 mg/dL    HDL Cholesterol 58 >=50 mg/dL    LDL Calculated 56 <=129 mg/dL    Patient Fasting > 8hrs? No    Comprehensive Metabolic Panel   Result Value Ref Range    Sodium 142 136 - 145 mmol/L    Potassium 4.4 3.5 - 5.0 mmol/L    Chloride 105 98 - 107 mmol/L    CO2 28 22 - 31 mmol/L    Anion Gap, Calculation 9 5 - 18 mmol/L    Glucose 89 70 - 125 mg/dL    BUN 17 8 - 22 mg/dL    Creatinine 0.80 0.60 - 1.10 mg/dL    GFR MDRD Af Amer >60 >60 mL/min/1.73m2    GFR MDRD Non Af Amer >60 >60 mL/min/1.73m2    Bilirubin, Total 0.4 0.0 - 1.0 mg/dL    Calcium 9.6 8.5 - 10.5 mg/dL    Protein, Total 7.5 6.0 - 8.0 g/dL    Albumin 4.3 3.5 - 5.0 g/dL    Alkaline Phosphatase 56 45 - 120 U/L    AST 20 0 - 40 U/L    ALT 10 0 - 45 U/L    Narrative    Fasting Glucose reference range is 70-99 mg/dL per  American Diabetes Association (ADA) guidelines.   HM2(CBC w/o Differential)   Result Value Ref Range    WBC 4.6 4.0 - 11.0 thou/uL    RBC 4.41 3.80 - 5.40 mill/uL    Hemoglobin 13.5 12.0 - 16.0 g/dL    Hematocrit 40.0 35.0 - 47.0 %    MCV 91 80 - 100 fL    MCH 30.7 27.0 - 34.0 pg    MCHC 33.8 32.0 - 36.0 g/dL    RDW 11.5 11.0 - 14.5 %    Platelets 192 140 - 440 thou/uL    MPV 7.3 7.0 - 10.0 fL       Your labs are normal.    Please call with questions or contact us using [employer].    Sincerely,        Electronically signed by Alicia Ghosh MD

## 2021-06-19 NOTE — PROGRESS NOTES
Progress Note    Reason for Visit:  Chief Complaint     Thyroid Problem          Progress Note:    HPI: This patient is here for follow-up because of papillary thyroid cancer.  She had thyroid cancer back in 2007.      Component      Latest Ref Rng & Units 10/26/2017 6/20/2018   Creatinine      0.60 - 1.10 mg/dL  0.83   GFR MDRD Af Amer      >60 mL/min/1.73m2  >60   GFR MDRD Non Af Amer      >60 mL/min/1.73m2  >60   Thyroglobulin Antibody      0.0 - 4.0 IU/mL  <0.9   Thyroglobulin, Serum or Plasma      1.3 - 31.8 ng/mL  <0.1 (L)   Thyroglobulin by LC-MS/MS, Serum/Plasma      1.3 - 31.8 ng/mL  Not Applicable   TSH      0.30 - 5.00 uIU/mL  0.08 (L)   T3, Total      45 - 175 ng/dL  63   Free T4      0.7 - 1.8 ng/dL  1.2   Potassium      3.5 - 5.0 mmol/L  4.2   Vitamin D, Total (25-Hydroxy)      30.0 - 80.0 ng/mL 53.2 34.8   Calcium      8.5 - 10.5 mg/dL  9.1     US THYROID  6/14/2016 4:56 PM     INDICATION: Malignant neoplasm of thyroid gland  TECHNIQUE: Routine.  COMPARISON: 10/15/2015.     FINDINGS:  The patient is status post thyroidectomy. No nodules in the thyroidectomy bed. No cervical lymphadenopathy. At the site where patient palpates an abnormality in the submental region there is a small simple appearing cyst measuring 5 x 3 x 4 mm.     IMPRESSION:    CONCLUSION:  1.  No evidence for recurrent thyroid carcinoma  Review of Systems:    Nervous System: No headache, dizziness, fainting or memory loss. No tingling sensation of hand or feet.  Ears: No hearing loss or ringing in the ears  Eyes: No blurring of vision, redness, itching or dryness.  Nose: No nosebleed or loss of smell  Mouth: No mouth sores or loss of taste  Throat: No hoarseness or difficulty swallowing  Neck: No enlarged thyroid or lymph nodes.  Heart: No chest pain, palpitation or irregular heartbeat. No swelling of hands or feet  Lungs: No shortness of breath, cough, night sweats, wheezing or hemoptysis.  Gastrointestinal: No nausea or vomiting,  constipation or diarrhea.  No acid reflux, abdominal pain or blood in stools.  Kidney/Bladdr: No polyuria, polydipsia, nocturia or hematuria.  Genital/Sexual: No loss of libido  Skin: No rash, hair loss or hirsutism.  No abnormal striae  Muscles/Joints/Bones: No morning stiffness, muscle aches and pain or loss of height.    Current Medications:  Current Outpatient Prescriptions   Medication Sig     aspirin 81 MG EC tablet Take 81 mg by mouth daily.     cholecalciferol, vitamin D3, (VITAMIN D3) 1,000 unit capsule Take 1,000 Units by mouth daily.     escitalopram oxalate (LEXAPRO) 5 MG tablet Take 5 mg by mouth daily.     hydroxychloroquine (PLAQUENIL) 200 mg tablet Take 200 mg by mouth daily.     levothyroxine (SYNTHROID, LEVOTHROID) 75 MCG tablet Take 1 tablet on Tuesday, Thursday, Saturday and Sunday.     levothyroxine (SYNTHROID, LEVOTHROID) 88 MCG tablet Take 1 tablet on Monday, Wednesday and Friday.     MULTIVITAMIN ORAL Take 1 tablet by mouth daily.       Patients Active Problems:  Patient Active Problem List   Diagnosis     Lupus Anticoagulant     Acute Gastric Ulcer     Antiphospholipid Antibody Syndrome     Hypothyroidism     Sjogren's Syndrome     Atrial Premature Complex     Anxiety     Cyst of neck     Adenomatous colon polyp       History:   reports that she has never smoked. She has never used smokeless tobacco.   reports that she has never smoked. She has never used smokeless tobacco. Her alcohol and drug histories are not on file.  History   Smoking Status     Never Smoker   Smokeless Tobacco     Never Used      reports that she has never smoked. She has never used smokeless tobacco. Her alcohol and drug histories are not on file.  History   Sexual Activity     Sexual activity: Not on file     Past Medical History:   Diagnosis Date     Benign Adenomatous Polyp Of The Large Intestine     Created by Conversion      Benign paroxysmal positional vertigo     Created by Conversion      Hx of radiation  therapy     thyroid     Palpitations     Created by Conversion      Papillary Carcinoma Of The Thyroid Gland     Created by Conversion      Family History   Problem Relation Age of Onset     Cancer Father      Colon cancer Maternal Grandmother      Depression Son      Anxiety disorder Son      Colon cancer Paternal Grandfather      Breast cancer Neg Hx      Past Medical History:   Diagnosis Date     Benign Adenomatous Polyp Of The Large Intestine     Created by Conversion      Benign paroxysmal positional vertigo     Created by Conversion      Hx of radiation therapy     thyroid     Palpitations     Created by Conversion      Papillary Carcinoma Of The Thyroid Gland     Created by Conversion      Past Surgical History:   Procedure Laterality Date     [place] THYROIDECTOMY      Description: Thyroid Surgery Total Thyroidectomy;  Recorded: 02/07/2012;       Vitals   vitals were not taken for this visit.        Exam  General appearance: The patient looked well, not in acute distress.  Eyes: no evidence of thyroid eye disease.   Retinal exam: No evidence of diabetic retinopathy.  Mouth and Throat: Normal  Neck: No evidence of thyromegaly, enlarged lymph node or tenderness  Chest: Trachea is central. Chest is clear to auscultation and percussion. Breat sounds are normal.  Cardiovascular exam: JVP is not raised. Heart sounds are normal, no murmurs or rub  Peripheral pulses are palpable.   Abdomen: No masses or tenderness.    Back: No vertebral tenderness or kyphosis.  Extremities: No evidence of leg edema.   Skin: Normal to touch.  No abnormal striae  Neurologic exam:  Visual fields are intact by confrontation, grossly intact. No evidence of peripheral neuropathy.  Detailed foot exam normal.        Diagnosis:  No diagnosis found.    Orders:   No orders of the defined types were placed in this encounter.        Assessment and Plan: Papillary thyroid cancer patient had thyroid cancer back in 2007 she received 50 mCi of  radioactive iodine.    She is currently taking Synthroid 88 mcg on Monday Wednesday Friday the rest of the day she takes 75 mcg.  The patient is taking actual Synthroid brand name.    Her TSH dropped from 0.74 - 0.08 total T3 63 free T4 is 1.2.    Patient has very rare palpitation.    I will cut the dose down to 88 mcg on Monday and Friday and the rest of the days will stay on 75.    The patient is taking aspirin 81 mg vitamin D 1000 units daily actually she takes that in the winter her vitamin D drop to 34 I did advise her to take vitamin D regularly she takes Plaquenil.    Thyroid exam is normal no lymph nodes she had an ultrasound in 2016 which was clear.  She will return to clinic in 6 month.    I have reviewed and ordered clinical lab test    I have reviewed and ordered radiology tests.    I have reviewed and ordered her medication as required.    I have reviewed her test results and advised with the performing physician.    I have reviewed the patient's old records.    I have reviewed and summarized the patient old records.    I did spend 40 minutes with the patient more than 50% was spent on counseling and managing her care.

## 2021-06-20 NOTE — LETTER
Letter by Alicia Ghosh MD at      Author: Alicia Ghosh MD Service: -- Author Type: --    Filed:  Encounter Date: 1/21/2020 Status: (Other)         Emilia Prince  [address] [facility] [address]             January 21, 2020         Dear Ms. Prince,    Below are the results from your recent visit:    Resulted Orders   Comprehensive Metabolic Panel   Result Value Ref Range    Sodium 141 136 - 145 mmol/L    Potassium 4.4 3.5 - 5.0 mmol/L    Chloride 106 98 - 107 mmol/L    CO2 26 22 - 31 mmol/L    Anion Gap, Calculation 9 5 - 18 mmol/L    Glucose 89 70 - 125 mg/dL    BUN 18 8 - 22 mg/dL    Creatinine 0.90 0.60 - 1.10 mg/dL    GFR MDRD Af Amer >60 >60 mL/min/1.73m2    GFR MDRD Non Af Amer >60 >60 mL/min/1.73m2    Bilirubin, Total 0.5 0.0 - 1.0 mg/dL    Calcium 9.3 8.5 - 10.5 mg/dL    Protein, Total 7.2 6.0 - 8.0 g/dL    Albumin 4.3 3.5 - 5.0 g/dL    Alkaline Phosphatase 70 45 - 120 U/L    AST 19 0 - 40 U/L    ALT 9 0 - 45 U/L    Narrative    Fasting Glucose reference range is 70-99 mg/dL per  American Diabetes Association (ADA) guidelines.   Vitamin B12   Result Value Ref Range    Vitamin B-12 532 213 - 816 pg/mL   Vitamin D, Total (25-Hydroxy)   Result Value Ref Range    Vitamin D, Total (25-Hydroxy) 38.7 30.0 - 80.0 ng/mL    Narrative    Deficiency <10.0 ng/mL  Insufficiency 10.0-29.9 ng/mL  Sufficiency 30.0-80.0 ng/mL  Toxicity (possible) >100.0 ng/mL   Lipid Cascade   Result Value Ref Range    Cholesterol 144 <=199 mg/dL    Triglycerides 75 <=149 mg/dL    HDL Cholesterol 56 >=50 mg/dL    LDL Calculated 73 <=129 mg/dL    Patient Fasting > 8hrs? Yes    Morphology, Path Smear Review (MORP)   Result Value Ref Range    Pathology, Smear Review See Separate Pathology Report (!) (none)    WBC 4.0 4.0 - 11.0 thou/uL    RBC 4.34 3.80 - 5.40 mill/uL    Hemoglobin 13.2 12.0 - 16.0 g/dL    Hematocrit 40.3 35.0 - 47.0 %    MCV 93 80 - 100 fL    MCH 30.4 27.0 - 34.0 pg    MCHC 32.8 32.0 - 36.0 g/dL    RDW  12.2 11.0 - 14.5 %    Platelets 176 140 - 440 thou/uL    MPV 10.4 8.5 - 12.5 fL    Neutrophils % 57 50 - 70 %    Lymphocytes % 28 20 - 40 %    Monocytes % 10 2 - 10 %    Eosinophils % 5 0 - 6 %    Basophils % 1 0 - 2 %    Neutrophils Absolute 2.3 2.0 - 7.7 thou/uL    Lymphocytes Absolute 1.1 0.8 - 4.4 thou/uL    Monocytes Absolute 0.4 0.0 - 0.9 thou/uL    Eosinophils Absolute 0.2 0.0 - 0.4 thou/uL    Basophils Absolute 0.0 0.0 - 0.2 thou/uL   Iron and Transferrin Iron Binding Capacity   Result Value Ref Range    Iron 113 42 - 175 ug/dL    Transferrin 261 212 - 360 mg/dL    Transferrin Saturation, Calculated 35 20 - 50 %    Transferrin IBC, Calculated 327 313 - 563 ug/dL   Magnesium   Result Value Ref Range    Magnesium 2.0 1.8 - 2.6 mg/dL   Peripheral Blood Smear, Path Review   Result Value Ref Range    Case Report       Peripheral Blood Morphology Report                Case: [id number]                                   Authorizing Provider:  Alicia Ghosh MD    Collected:           01/20/2020 1041              Ordering Location:     [facility]     Received:            01/21/2020 0653                                     Medicine/OB                                                                  Pathologist:           Frankie Nunez MD                                                        Specimen:    Peripheral Blood                                                                           Final Diagnosis       PERIPHERAL BLOOD:     -   NO SIGNIFICANT ABNORMALITIES    Comment       The clinical history has been reviewed. Borderline leukopenia.    Clinical Information R53.83     Peripheral Smear       Red blood cells are normal in number and overall normochromic and normocytic. Anisopoikilocytosis, polychromasia, and rouleaux formation are not prominent.    The white blood cell count and differential appear as reported on the CBC. Leukocytes are normal in number and appearance, consisting  predominantly of segmented and band neutrophils with fewer numbers of lymphocytes and monocytes. No blasts or dysplastic changes are identified.    Platelets are normal in number and appearance.    Charges CPT: 55513  ICD-10: E03.9        Your labs are normal.    Please call with questions or contact us using MyChart.    Sincerely,        Electronically signed by Alicia Ghosh MD

## 2021-06-20 NOTE — LETTER
Letter by Alicia Ghosh MD at      Author: Alicia Ghosh MD Service: -- Author Type: --    Filed:  Encounter Date: 8/24/2020 Status: (Other)         Emilia Prince  [address] [facility] [address]             August 24, 2020         Dear Ms. Prince,    Below are the results from your recent visit:    Resulted Orders   Lipid Cascade   Result Value Ref Range    Cholesterol 158 <=199 mg/dL    Triglycerides 77 <=149 mg/dL    HDL Cholesterol 61 >=50 mg/dL    LDL Calculated 82 <=129 mg/dL    Patient Fasting > 8hrs? No    Thyroglobulin Antibody   Result Value Ref Range    Thyroglobulin Antibody <0.9 0.0 - 4.0 IU/mL      Comment:      INTERPRETIVE INFORMATION: Thyroglobulin Antibody                                    A value of 4.0 IU/mL or less indicates a negative result for   thyroglobulin antibodies.    The Thyroglobulin Antibody assay is being performed using the   [employer] Access DxI method.  Performed By: [facility]  [address] [place] [id number]  : Moises Matta MD, MS   Thyroglobulin, Serum or Plasma with Reflex to LC-MS/MS or DIVYA   Result Value Ref Range    Thyroglobulin Antibody <0.9 0.0 - 4.0 IU/mL      Comment:      INTERPRETIVE INFORMATION: Thyroglobulin Antibody                                    A value of 4.0 IU/mL or less indicates a negative result for   thyroglobulin antibodies.    The Thyroglobulin Antibody assay is being performed using the   [employer] [place] Access DxI method.    Thyroglobulin, Serum or Plasma <0.1 (L) 1.3 - 31.8 ng/mL      Comment:      INTERPRETIVE INFORMATION: Thyroglobulin, Serum or Plasma    Specimens negative for thyroglobulin antibodies (TgAb) are tested   for thyroglobulin (Tg) by chemiluminescent immunoassay (DIVYA) using   the [employer] [place] Access DxI method. Specimens with TgAb results   above the upper reference limit are tested for Tg by   high-performance liquid chromatography-tandem mass spectrometry    (LC-MS/MS). Results obtained with different test methods or kits   cannot be used interchangeably. Tg results, regardless of   concentration, should not be interpreted as absolute evidence for   the presence or absence of papillary or follicular thyroid cancer.   Tg testing is not recommended for use as a screening procedure to   detect the presence of thyroid cancer in the general population.    Thyroglobulin by LC-MS/MS, Serum/Plasma Not Applicable 1.3 - 31.8 ng/mL      Comment:      INTERPRETIVE INFORMATION: Thyroglobulin by LC-MS/MS, Serum/Plasma    Lower limit of detection for Thyroglobulin by LC-MS/MS is 0.5   ng/mL.    Test developed and characteristics determined by [facility]. See Compliance Statement B: [email or web address]  Performed By: [facility]  [address] [place] [id number]  : oMises Matta MD, MS   Comprehensive Metabolic Panel   Result Value Ref Range    Sodium 142 136 - 145 mmol/L    Potassium 4.1 3.5 - 5.0 mmol/L    Chloride 105 98 - 107 mmol/L    CO2 27 22 - 31 mmol/L    Anion Gap, Calculation 10 5 - 18 mmol/L    Glucose 85 70 - 125 mg/dL    BUN 17 8 - 22 mg/dL    Creatinine 0.83 0.60 - 1.10 mg/dL    GFR MDRD Af Amer >60 >60 mL/min/1.73m2    GFR MDRD Non Af Amer >60 >60 mL/min/1.73m2    Bilirubin, Total 0.4 0.0 - 1.0 mg/dL    Calcium 9.5 8.5 - 10.5 mg/dL    Protein, Total 7.4 6.0 - 8.0 g/dL    Albumin 4.4 3.5 - 5.0 g/dL    Alkaline Phosphatase 77 45 - 120 U/L    AST 19 0 - 40 U/L    ALT 15 0 - 45 U/L    Narrative    Fasting Glucose reference range is 70-99 mg/dL per  American Diabetes Association (ADA) guidelines.       Your labs are normal.    Please call with questions or contact us using [employer].    Sincerely,        Electronically signed by Alicia Ghosh MD

## 2021-06-20 NOTE — LETTER
Letter by Alicia Ghosh MD at      Author: Alicia Ghosh MD Service: -- Author Type: --    Filed:  Encounter Date: 9/18/2020 Status: (Other)         Emilia Prince  [address] [facility] [address]             September 18, 2020         Dear Ms. Prince,    Below are the results from your recent visit:    Resulted Orders   DXA Bone Density Scan    Narrative    9/16/2020      RE: Emilia Prince  YOB: 1970        Dear Alicia Ghosh,    Patient Profile:  50 y.o. female, menopausal, is here for the first bone density test.   History of fractures - None. Family history of osteoporosis - Unknown.    Family history of hip fracture: None. Smoking history - Past. Osteoporosis   treatment past -  No. Osteoporosis treatment current - No.  Chronic   medical problems - History of kidney stones, Lupus and Thyroid disease.   High risk medications -  Thyroid;  Yes, Currently.      Assessment:    1. The spine bone density L1-L4 with T-score -2.0.  2. Femoral bone densities show left femoral neck T- score -1.3 and right   femoral neck T-score -1.3.  3. Trabecular bone score indicates good trabecular bone architecture.      50 y.o. female with LOW BONE DENSITY (OSTEOPENIA) and LOW fracture risk,   adjusted for the TBS, with major osteoporotic fracture risk 3.8% and hip   fracture risk 0.2%.         Recommendations:  Appropriate calcium, vitamin D supplements, along with balance and weight   bearing exercise recommended with follow up bone density scan in 3 years.      Bone densitometry was performed on your patient using our Aquaspy   densitometer. The results are summarized and a copy of the actual scans   are included for your review. In conformity with the International Society   of Clinical Densitometry's most recent position statement for DXA   interpretation (2015), the diagnosis will be made on the lowest measured   T-score of the lumbar spine, femoral neck, total proximal femur or  33%   radius. Note the change in terminology for diagnostic classification from   OSTEOPENIA to LOW BONE MASS. All trending for sequential exams will be   done using multiple vertebrae or the total proximal femur. Fracture risk   is based on the WHO Fracture Risk Assessment Tool (FRAX). If additional   information is needed or if you would like to discuss the results, please   do not hesitate to call me.       Thank you for referring this patient to [facility].   We are happy to be of service in support of you and your practice. If you   have any questions or suggestions to improve our service, please call me   at [phone].     Sincerely,     Homa Andrew M.D. C.C.D.  [facility]oporosis Services, [facility]       See results above.  Repeat bone density in 3 years.    Please call with questions or contact us using [employer].    Sincerely,        Electronically signed by Alicia Ghosh MD

## 2021-06-20 NOTE — LETTER
Letter by Alicia Ghosh MD at      Author: Alicia Ghosh MD Service: -- Author Type: --    Filed:  Encounter Date: 12/23/2019 Status: Signed         Emilia Prince  [address] [place] [id number]             December 23, 2019         Dear Ms. Prince,    Below are the results from your recent visit:    Resulted Orders   Thyroid Cascade   Result Value Ref Range    TSH 0.07 (L) 0.30 - 5.00 uIU/mL   T4, Free   Result Value Ref Range    Free T4 1.2 0.7 - 1.8 ng/dL       TSH is too low meaning medication is too high.  I know with your history of thyroid cancer we want to keep you on the overactive side but this seems to be too much in my opinion.  Per our chart you are on levothyroxine 88 mcg 1 day a week and 75 mcg 6 days a week.  I would have you change that to taking 75 mcg daily.  Please let me know if you are agreeable and I will update this in your chart.  Please also let me know if you need a refill of the 75 mcg prescription    Please call with questions or contact us using [employer].    Sincerely,        Electronically signed by Alicia Ghosh MD

## 2021-06-22 NOTE — PROGRESS NOTES
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     She is currently taking Synthroid 88 mcg on Monday Wednesday Friday the rest of the day she takes 75 mcg.  The patient is taking actual Synthroid brand name.     Her TSH dropped from 0.74 - 0.08 total T3 63 free T4 is 1.2.     Patient has very rare palpitation.     I will cut the dose down to 88 mcg on Monday and Friday and the rest of the days will stay on 75.     The patient is taking aspirin 81 mg vitamin D 1000 units daily actually she takes that in the winter her vitamin D drop to 34 I did advise her to take vitamin D regularly she takes Plaquenil.     Thyroid exam is normal no lymph nodes she had an ultrasound in 2016 which was clear    Component      Latest Ref Rng & Units 6/20/2018 12/26/2018   Thyroglobulin Antibody      0.0 - 4.0 IU/mL <0.9 <0.9   Thyroglobulin, Serum or Plasma      1.3 - 31.8 ng/mL <0.1 (L) <0.1 (L)   Thyroglobulin by LC-MS/MS, Serum/Plasma      1.3 - 31.8 ng/mL Not Applicable Not Applicable   Creatinine      0.60 - 1.10 mg/dL  0.76   GFR MDRD Af Amer      >60 mL/min/1.73m2  >60   GFR MDRD Non Af Amer      >60 mL/min/1.73m2  >60   Vitamin D, Total (25-Hydroxy)      30.0 - 80.0 ng/mL 34.8 41.6   TSH      0.30 - 5.00 uIU/mL 0.08 (L) 0.03 (L)   Free T4      0.7 - 1.8 ng/dL 1.2 1.1   T3, Total      45 - 175 ng/dL 63 66   Potassium      3.5 - 5.0 mmol/L  4.3   Calcium      8.5 - 10.5 mg/dL  8.7         Review of Systems:    Nervous System: No headache, dizziness, fainting or memory loss. No tingling sensation of hand or feet.  Ears: No hearing loss or ringing in the ears  Eyes: No blurring of vision, redness, itching or dryness.  Nose: No nosebleed or loss of smell  Mouth: No mouth sores or loss of taste  Throat: No hoarseness or difficulty swallowing  Neck: No enlarged  thyroid or lymph nodes.  Heart: No chest pain, palpitation or irregular heartbeat. No swelling of hands or feet  Lungs: No shortness of breath, cough, night sweats, wheezing or hemoptysis.  Gastrointestinal: No nausea or vomiting, constipation or diarrhea.  No acid reflux, abdominal pain or blood in stools.  Kidney/Bladdr: No polyuria, polydipsia, nocturia or hematuria.  Genital/Sexual: No loss of libido  Skin: No rash, hair loss or hirsutism.  No abnormal striae  Muscles/Joints/Bones: No morning stiffness, muscle aches and pain or loss of height.    Current Medications:  Current Outpatient Medications   Medication Sig     aspirin 81 MG EC tablet Take 81 mg by mouth daily.     cholecalciferol, vitamin D3, (VITAMIN D3) 1,000 unit capsule Take 1,000 Units by mouth daily.     escitalopram oxalate (LEXAPRO) 5 MG tablet Take 1 tablet (5 mg total) by mouth daily.     hydroxychloroquine (PLAQUENIL) 200 mg tablet Take 200 mg by mouth daily.     levothyroxine (SYNTHROID, LEVOTHROID) 75 MCG tablet Take 1 tablet on Tuesday, Wednesday, Thursday, Saturday, Sunday. D.a.w. Please  Dispense brand name Synthroid     levothyroxine (SYNTHROID, LEVOTHROID) 88 MCG tablet Take 1 tablet on Monday and Friday.     MULTIVITAMIN ORAL Take 1 tablet by mouth daily.       Patients Active Problems:  Patient Active Problem List   Diagnosis     Lupus Anticoagulant     Acute Gastric Ulcer     Antiphospholipid Antibody Syndrome     Hypothyroidism     Sjogren's Syndrome     Atrial Premature Complex     Anxiety     Cyst of neck     Adenomatous colon polyp       History:   reports that  has never smoked. she has never used smokeless tobacco.   reports that  has never smoked. she has never used smokeless tobacco. Her alcohol and drug histories are not on file.  Social History     Tobacco Use   Smoking Status Never Smoker   Smokeless Tobacco Never Used      reports that  has never smoked. she has never used smokeless tobacco. Her alcohol and drug  "histories are not on file.  Social History     Substance and Sexual Activity   Sexual Activity Not on file     Past Medical History:   Diagnosis Date     Benign Adenomatous Polyp Of The Large Intestine     Created by Conversion      Benign paroxysmal positional vertigo     Created by Conversion      Hx of radiation therapy     thyroid     Palpitations     Created by Conversion      Papillary Carcinoma Of The Thyroid Gland     Created by Conversion      Family History   Problem Relation Age of Onset     Cancer Father      Colon cancer Maternal Grandmother      Depression Son      Anxiety disorder Son      Colon cancer Paternal Grandfather      Breast cancer Neg Hx      Past Medical History:   Diagnosis Date     Benign Adenomatous Polyp Of The Large Intestine     Created by Conversion      Benign paroxysmal positional vertigo     Created by Conversion      Hx of radiation therapy     thyroid     Palpitations     Created by Conversion      Papillary Carcinoma Of The Thyroid Gland     Created by Conversion      Past Surgical History:   Procedure Laterality Date     MI THYROIDECTOMY      Description: Thyroid Surgery Total Thyroidectomy;  Recorded: 02/07/2012;       Vitals   height is 5' 4\" (1.626 m) and weight is 129 lb (58.5 kg). Her blood pressure is 84/56 (abnormal).         Exam  General appearance: The patient looked well, not in acute distress.  Eyes: no evidence of thyroid eye disease.   Retinal exam: No evidence of diabetic retinopathy.  Mouth and Throat: Normal  Neck: No evidence of thyromegaly, enlarged lymph node or tenderness  Chest: Trachea is central. Chest is clear to auscultation and percussion. Breat sounds are normal.  Cardiovascular exam: JVP is not raised. Heart sounds are normal, no murmurs or rub  Peripheral pulses are palpable.   Abdomen: No masses or tenderness.    Back: No vertebral tenderness or kyphosis.  Extremities: No evidence of leg edema.   Skin: Normal to touch.  No abnormal " striae  Neurologic exam:  Visual fields are intact by confrontation, grossly intact. No evidence of peripheral neuropathy.  Detailed foot exam normal.        Diagnosis:  No diagnosis found.    Orders:   No orders of the defined types were placed in this encounter.        Assessment and Plan: This patient is here for follow-up because of thyroid cancer.    She had surgery with radioactive iodine ablation back in 2007.    She is currently on Synthroid 88 mcg on Monday and Friday the rest of the day she takes 75 and micrograms.    Her TSH is 0.03 free T4 1.1 total T3 is 66.    She feels fatigue and tired but no palpitation.  I did advise her to consider Bethany Thyroid the patient will think about it and if she agrees we will put her on armor probably 60 mg may be like 45 mg 3 times a week and the rest of the days 60 mg    She takes aspirin 81 mg she has lupus anticoagulant positive she takes Plaquenil 200 mg Lexapro 5 mg vitamin D 1000 units a day.    Thyroid globin tumor marker and thyroglobulin antibodies are negative we will do thyroid ultrasound before next visit.                      I have reviewed and ordered clinical lab test    I have reviewed and ordered her medication as required.    I have reviewed her test results and advised with the performing physician.    I have reviewed the patient's old records.    I have reviewed and summarized the patient old records.

## 2021-06-22 NOTE — PATIENT INSTRUCTIONS - HE
It is my pleasure seeing you in the clinic today.    Consider Adelfo Thyroid.    Follow-up in 6 months check creatinine potassium calcium 25-hydroxy vitamin D TSH free T4 thyroglobulin tumor marker total T3 before next visit.  Do thyroid ultrasound before next visit.              Thank you for allowing me to participate in your care.        We work very hard to achieve the best quality of care.  We would be very grateful if you complete any survey you receive regarding this visit, so that we continue to improve our care.

## 2021-06-24 NOTE — TELEPHONE ENCOUNTER
Spoke with Austin from CT, stated the scan was only a partial head so the radiologist cannot add further info. Pt would need a new scan-full head CT done.

## 2021-06-24 NOTE — PROGRESS NOTES
Assessment:   1. Visit for screening mammogram  Mammo Screening Bilateral   2. Screening for malignant neoplasm of breast     3. Fluid collection of middle ear      Right   4. Hypothyroidism due to Hashimoto's thyroiditis  Thyroid Cascade   5. Anxiety  Lipid Cascade    Comprehensive Metabolic Panel    HM2(CBC w/o Differential)       Plan:  Sees endocrinology and rheumatology.    Sees gynecology for physicals.     Ordered mammogram.    Colonoscopy due February 2023.    Take Levothyroxine 75 mcg on Tuesday, Wednesday, Thursday, Friday, Saturday, and Sunday.  Take 88 mcg on Monday.     If needing to fall asleep, 1-3 mg melatonin before bedtime or 0.5 - 1 mg in the middle of the night.     Dr. Ghosh will contact the radiologist to see if they will comment on the entire brain from your temporal CT in the urgency room. If they cannot comment on it and you continue waking up with headaches, we will want a new head CT scan.     Seeing ENT for fluid in rigt middle ear.    Lab apt for TSH in 4-6 weeks.    The following high BMI interventions were performed this visit: encouragement to exercise and dietary needs education      Subjective:  Chief Complaint   Patient presents with     Medication Management     escitalopram       Emilia Prince, a 48 y.o. year old, comes in to clinic with complaints of fatigue and headaches and for management of medications.    Anxiety: She is tolerating the Lexapro well and has been feeling well.     Endocrinology: She believes she might be in menopause, but does not get her period due to her Mirena IUD.   2.5 years ago she was told by her endocrinologist that she was in perimenopause. She gets hot flashes, and has noted an increase in fatigue and headaches.    Weight changes: The patient states that she has gained 10 lbs in the past year. She states that her diet is has not changed and since September she began exercising at the gym 3-4 times a week. She does not believe it is due to increased  muscle mass because her pants are tighter in her abdomen and butt.     Fatigue: The patient states that lately it is harder to wake up in the morning. She still gets 7-9 hours of sleep each night and notes it is sometimes difficult to sleep due to her  s snoring. She wakes up once a night to use the bathroom and has a hard time falling back asleep.     Headaches: She states that she has been recently waking up with headaches for the past few months. She denies any injury or head trauma. She notes she drinks one caffeinated soda a day. She had a head CT scan in January demonstrating fluid in her right middle ear.     Current Outpatient Medications   Medication Sig     aspirin 81 MG EC tablet Take 81 mg by mouth daily.     cholecalciferol, vitamin D3, (VITAMIN D3) 1,000 unit capsule Take 1,000 Units by mouth daily.     escitalopram oxalate (LEXAPRO) 5 MG tablet Take 1 tablet (5 mg total) by mouth daily.     hydroxychloroquine (PLAQUENIL) 200 mg tablet Take 200 mg by mouth daily.     levothyroxine (SYNTHROID, LEVOTHROID) 75 MCG tablet Take 1 tablet on Tuesday, Wednesday, Thursday, Saturday, Sunday. D.a.w. Please  Dispense brand name Synthroid     levothyroxine (SYNTHROID, LEVOTHROID) 88 MCG tablet Take 1 tablet on Monday and Friday.     MULTIVITAMIN ORAL Take 1 tablet by mouth daily.     SYNTHROID 75 mcg tablet TAKE 1 TABLET ON TUESDAY, THURSDAY, SATURDAY AND SUNDAY.       Patient Active Problem List   Diagnosis     Lupus Anticoagulant     Acute Gastric Ulcer     Antiphospholipid Antibody Syndrome     Hypothyroidism     Sjogren's Syndrome     Atrial Premature Complex     Anxiety     Cyst of neck     Adenomatous colon polyp       ROS: No fevers, chills, chest pain, shortness of breath, joint pain, rash, lower extremity edema  Positive for: fatigue, headaches, weight gain.     PMFS:  Had thyroid cancer  Family history of osteoporosis on maternal side.   .    Objective:  BP 93/65 (Patient Site: Left Arm,  Patient Position: Sitting, Cuff Size: Adult Regular)   Pulse 68   Temp 97.6  F (36.4  C) (Oral)   Resp 16   Wt 131 lb 12.8 oz (59.8 kg)   BMI 22.62 kg/m    General: No apparent distress  Neuro: Alert and oriented x 3, cranial nerves II-XII intact  Psych: Normal mood and affect.     ADDITIONAL HISTORY SUMMARIZED (2): 1/17/19 ENT visit reviewed regarding fluid in her right ear.   DECISION TO OBTAIN EXTRA INFORMATION (1): Care everywhere accessed.  RADIOLOGY TESTS (1): 1/17/19 Radiology read reviewed regarding temporal CT scan.  LABS (1): Labs ordered today.  MEDICINE TESTS (1): None.  INDEPENDENT REVIEW (2 each): None.       The visit lasted a total of 42 minutes face to face with the patient. Over 50% of the time was spent counseling and educating the patient about her fatigue, weight gain, and headaches.    IHelena, am scribing for and in the presence of, Dr. Ghosh.    I, Dr. Ghosh, personally performed the services described in this documentation, as scribed by Helena Goodrich in my presence, and it is both accurate and complete.    Total data points: 5

## 2021-06-24 NOTE — PATIENT INSTRUCTIONS - HE
Sees endocrinology and rheumatology.    Sees gynecology for physicals.     Ordered mammogram.    Colonoscopy due February 2023.    Take Levothyroxine 75 mcg on Tuesday, Wednesday, Thursday, Friday, Saturday, and Sunday.  Take 88 mcg on Monday.     If needing to fall asleep, 1-3 mg melatonin before bedtime or 0.5 - 1 mg in the middle of the night.     Dr. Ghosh will contact the radiologist to see if they will comment on the entire brain from your temporal CT in the urgency room. If they cannot comment on it and you continue waking up with headaches, we will want a new head CT scan.     Seeing ENT for fluid in rigt middle ear.    Lab apt for TSH in 4-6 weeks.

## 2021-06-24 NOTE — TELEPHONE ENCOUNTER
Please call patient.  See if she is still waking up with headaches.  Thank you to my nurse that contacted the urgency room.  They cannot add a full head CT onto the temporal bone CT.  If she is still waking up with headaches I will order a head CT.  Please let me know.

## 2021-06-24 NOTE — TELEPHONE ENCOUNTER
Informed patient of providers message below. Patient states that she would like to hold off on the head CT as of now, since the headaches are periodically and she is not waking up everyday with headaches. Patient will call our clinic back if she would like Dr. Ghosh to place the order for the head CT. No further questions or concerns at this time.

## 2021-06-24 NOTE — TELEPHONE ENCOUNTER
Will you call the [facility]? This patient had a temporal bone head CT there I believe in [place].   Will you see if a radiologist can do a full head CT report on her temporal bone head CT? I am wondering if they did enough scanning for this. She is waking up with headaches and I am trying to avoid doing another CT if they can comment on the whole head from the CT done at the Urgency room?  Thanks! Alicia Ghosh MD

## 2021-07-03 ENCOUNTER — AMBULATORY - HEALTHEAST (OUTPATIENT)
Dept: LAB | Facility: CLINIC | Age: 51
End: 2021-07-03

## 2021-07-03 DIAGNOSIS — E89.0 POSTSURGICAL HYPOTHYROIDISM: ICD-10-CM

## 2021-07-03 NOTE — ADDENDUM NOTE
Addendum Note by Nils Fishman CMA at 12/17/2019  9:26 AM     Author: Nils Fishman CMA Service: -- Author Type: Certified Medical Assistant    Filed: 12/17/2019  9:26 AM Encounter Date: 12/14/2019 Status: Signed    : Nils Fishman CMA (Certified Medical Assistant)    Addended by: NILS FISHMAN on: 12/17/2019 09:26 AM        Modules accepted: Orders

## 2021-07-03 NOTE — ADDENDUM NOTE
Addendum Note by Alicia Ghosh MD at 12/16/2019  5:52 PM     Author: Alicia Ghosh MD Service: -- Author Type: Physician    Filed: 12/16/2019  5:52 PM Encounter Date: 12/14/2019 Status: Signed    : Alicia Ghosh MD (Physician)    Addended by: ALICIA GHOSH on: 12/16/2019 05:52 PM        Modules accepted: Orders

## 2021-07-03 NOTE — ADDENDUM NOTE
Addendum Note by Alicia Ghosh MD at 12/16/2019  5:54 PM     Author: Alicia Ghosh MD Service: -- Author Type: Physician    Filed: 12/16/2019  5:54 PM Encounter Date: 12/14/2019 Status: Signed    : Alicia Ghosh MD (Physician)    Addended by: ALICIA GHOSH on: 12/16/2019 05:54 PM        Modules accepted: Orders

## 2021-07-03 NOTE — ADDENDUM NOTE
Addendum Note by Hudson Mejía DO at 12/31/2019  5:55 AM     Author: Hudson Mejía DO Service: -- Author Type: Physician    Filed: 12/31/2019  5:55 AM Encounter Date: 12/28/2019 Status: Signed    : Hudson Mejía DO (Physician)    Addended by: HUDSON MEJÍA on: 12/31/2019 05:55 AM        Modules accepted: Orders

## 2021-07-03 NOTE — ADDENDUM NOTE
Addendum Note by Alicia Ghosh MD at 12/17/2019  3:26 PM     Author: Alicia Ghosh MD Service: -- Author Type: Physician    Filed: 12/17/2019  3:26 PM Encounter Date: 12/14/2019 Status: Signed    : Alicia Ghosh MD (Physician)    Addended by: ALICIA GHOSH on: 12/17/2019 03:26 PM        Modules accepted: Orders

## 2021-07-13 ENCOUNTER — RECORDS - HEALTHEAST (OUTPATIENT)
Dept: ADMINISTRATIVE | Facility: CLINIC | Age: 51
End: 2021-07-13

## 2021-07-21 ENCOUNTER — RECORDS - HEALTHEAST (OUTPATIENT)
Dept: ADMINISTRATIVE | Facility: CLINIC | Age: 51
End: 2021-07-21

## 2021-09-27 DIAGNOSIS — F41.1 GENERALIZED ANXIETY DISORDER: ICD-10-CM

## 2021-09-28 RX ORDER — ESCITALOPRAM OXALATE 5 MG/1
TABLET ORAL
Qty: 90 TABLET | Refills: 0 | Status: SHIPPED | OUTPATIENT
Start: 2021-09-28 | End: 2022-03-08

## 2021-09-28 NOTE — TELEPHONE ENCOUNTER
"  Disp Refills Start End TATI    escitalopram oxalate (LEXAPRO) 5 MG tablet 90 tablet 1 5/1/2021  No   Sig - Route: Take 1 tablet (5 mg total) by mouth daily. - Oral   Sent to pharmacy as: escitalopram 5 mg tablet (LEXAPRO)   E-Prescribing Status: Receipt confirmed by pharmacy (5/1/2021  1:53 PM CDT)       escitalopram oxalate (LEXAPRO) 5 MG tablet [[id number]]    Electronically signed by: Ivone King RN on 05/01/21 4948 Status: Active   Ordering user: Ivone King RN 05/01/21 7102 Ordering provider: Alicia Ghosh MD   Authorized by: Alicia Ghosh MD   Frequency: DAILY 05/01/21 - Until Discontinued Released by: Ivone King RN 05/01/21 6099   Diagnoses  Anxiety state [F41.1]       Last Written Prescription Date:  5/1/21  Last Fill Quantity: 90,  # refills: 1   Last office visit provider:  11/30/2020     Requested Prescriptions   Pending Prescriptions Disp Refills     escitalopram (LEXAPRO) 5 MG tablet [Pharmacy Med Name: ESCITALOPRAM 5 MG TABLET] 90 tablet 1     Sig: TAKE 1 TABLET BY MOUTH EVERY DAY       SSRIs Protocol Failed - 9/27/2021 12:05 PM        Failed - Medication is active on med list        Passed - Recent (12 mo) or future (30 days) visit within the authorizing provider's specialty     Patient has had an office visit with the authorizing provider or a provider within the authorizing providers department within the previous 12 mos or has a future within next 30 days. See \"Patient Info\" tab in inbasket, or \"Choose Columns\" in Meds & Orders section of the refill encounter.              Passed - Patient is age 18 or older        Passed - No active pregnancy on record        Passed - No positive pregnancy test in last 12 months             Austin Larson RN 09/28/21 11:34 AM  "

## 2021-11-10 PROBLEM — D68.59 PRIMARY HYPERCOAGULABLE STATE (H): Status: ACTIVE | Noted: 2021-11-10

## 2021-11-10 PROBLEM — K25.3 ACUTE GASTRIC ULCER: Status: ACTIVE | Noted: 2021-11-10

## 2021-11-10 PROBLEM — F41.1 ANXIETY STATE: Status: ACTIVE | Noted: 2021-11-10

## 2021-11-10 PROBLEM — D12.6 ADENOMATOUS COLON POLYP: Status: ACTIVE | Noted: 2017-10-26

## 2021-11-10 PROBLEM — D23.9 DYSPLASTIC NEVI: Status: ACTIVE | Noted: 2020-08-21

## 2021-11-10 PROBLEM — I49.1 ATRIAL PREMATURE COMPLEX: Status: ACTIVE | Noted: 2021-11-10

## 2021-11-10 PROBLEM — R89.4 OTHER AND UNSPECIFIED NONSPECIFIC IMMUNOLOGICAL FINDINGS: Status: ACTIVE | Noted: 2021-11-10

## 2021-11-10 PROBLEM — H90.11 CONDUCTIVE HEARING LOSS OF RIGHT EAR WITH UNRESTRICTED HEARING OF LEFT EAR: Status: ACTIVE | Noted: 2020-08-21

## 2021-11-10 PROBLEM — C73 THYROID CANCER (H): Status: ACTIVE | Noted: 2021-11-10

## 2021-11-10 PROBLEM — E03.9 HYPOTHYROIDISM: Status: ACTIVE | Noted: 2021-11-10

## 2021-11-20 ENCOUNTER — HEALTH MAINTENANCE LETTER (OUTPATIENT)
Age: 51
End: 2021-11-20

## 2021-12-06 ENCOUNTER — OFFICE VISIT (OUTPATIENT)
Dept: FAMILY MEDICINE | Facility: CLINIC | Age: 51
End: 2021-12-06
Payer: COMMERCIAL

## 2021-12-06 VITALS
WEIGHT: 132.25 LBS | TEMPERATURE: 98.4 F | HEART RATE: 54 BPM | BODY MASS INDEX: 22.58 KG/M2 | HEIGHT: 64 IN | RESPIRATION RATE: 16 BRPM | SYSTOLIC BLOOD PRESSURE: 102 MMHG | DIASTOLIC BLOOD PRESSURE: 62 MMHG

## 2021-12-06 DIAGNOSIS — E55.9 VITAMIN D DEFICIENCY: ICD-10-CM

## 2021-12-06 DIAGNOSIS — D64.9 ANEMIA, UNSPECIFIED TYPE: ICD-10-CM

## 2021-12-06 DIAGNOSIS — Z11.59 NEED FOR HEPATITIS C SCREENING TEST: ICD-10-CM

## 2021-12-06 DIAGNOSIS — C73 THYROID CANCER (H): ICD-10-CM

## 2021-12-06 DIAGNOSIS — Z11.4 SCREENING FOR HIV (HUMAN IMMUNODEFICIENCY VIRUS): ICD-10-CM

## 2021-12-06 DIAGNOSIS — Z00.00 ENCOUNTER FOR PREVENTATIVE ADULT HEALTH CARE EXAMINATION: ICD-10-CM

## 2021-12-06 DIAGNOSIS — M35.00 SICCA SYNDROME (H): ICD-10-CM

## 2021-12-06 DIAGNOSIS — E03.9 HYPOTHYROIDISM, UNSPECIFIED TYPE: ICD-10-CM

## 2021-12-06 DIAGNOSIS — D12.6 ADENOMATOUS POLYP OF COLON, UNSPECIFIED PART OF COLON: ICD-10-CM

## 2021-12-06 DIAGNOSIS — Z12.31 VISIT FOR SCREENING MAMMOGRAM: ICD-10-CM

## 2021-12-06 DIAGNOSIS — M35.1 MIXED CONNECTIVE TISSUE DISEASE (H): Primary | ICD-10-CM

## 2021-12-06 DIAGNOSIS — Z23 NEED FOR PROPHYLACTIC VACCINATION AND INOCULATION AGAINST INFLUENZA: ICD-10-CM

## 2021-12-06 DIAGNOSIS — D23.9 DYSPLASTIC NEVI: ICD-10-CM

## 2021-12-06 DIAGNOSIS — D68.59 PRIMARY HYPERCOAGULABLE STATE (H): ICD-10-CM

## 2021-12-06 PROBLEM — F32.A DEPRESSIVE DISORDER: Status: ACTIVE | Noted: 2021-04-02

## 2021-12-06 PROBLEM — K25.3 ACUTE GASTRIC ULCER: Status: RESOLVED | Noted: 2021-11-10 | Resolved: 2021-12-06

## 2021-12-06 PROBLEM — L25.9 CONTACT DERMATITIS: Status: ACTIVE | Noted: 2021-12-06

## 2021-12-06 PROBLEM — R87.619 ATYPICAL GLANDULAR CELLS ON CERVICAL PAP SMEAR: Status: ACTIVE | Noted: 2021-12-06

## 2021-12-06 LAB
ALBUMIN SERPL-MCNC: 4.1 G/DL (ref 3.5–5)
ALP SERPL-CCNC: 57 U/L (ref 45–120)
ALT SERPL W P-5'-P-CCNC: 17 U/L (ref 0–45)
ANION GAP SERPL CALCULATED.3IONS-SCNC: 10 MMOL/L (ref 5–18)
AST SERPL W P-5'-P-CCNC: 24 U/L (ref 0–40)
BILIRUB SERPL-MCNC: 0.4 MG/DL (ref 0–1)
BUN SERPL-MCNC: 15 MG/DL (ref 8–22)
CALCIUM SERPL-MCNC: 9.3 MG/DL (ref 8.5–10.5)
CHLORIDE BLD-SCNC: 105 MMOL/L (ref 98–107)
CHOLEST SERPL-MCNC: 153 MG/DL
CO2 SERPL-SCNC: 26 MMOL/L (ref 22–31)
CREAT SERPL-MCNC: 0.84 MG/DL (ref 0.6–1.1)
ERYTHROCYTE [DISTWIDTH] IN BLOOD BY AUTOMATED COUNT: 12.6 % (ref 10–15)
FASTING STATUS PATIENT QL REPORTED: NO
GFR SERPL CREATININE-BSD FRML MDRD: 81 ML/MIN/1.73M2
GLUCOSE BLD-MCNC: 78 MG/DL (ref 70–125)
HCT VFR BLD AUTO: 39 % (ref 35–47)
HDLC SERPL-MCNC: 66 MG/DL
HGB BLD-MCNC: 12.6 G/DL (ref 11.7–15.7)
LDLC SERPL CALC-MCNC: 67 MG/DL
MCH RBC QN AUTO: 29.9 PG (ref 26.5–33)
MCHC RBC AUTO-ENTMCNC: 32.3 G/DL (ref 31.5–36.5)
MCV RBC AUTO: 93 FL (ref 78–100)
PLATELET # BLD AUTO: 183 10E3/UL (ref 150–450)
POTASSIUM BLD-SCNC: 4.7 MMOL/L (ref 3.5–5)
PROT SERPL-MCNC: 6.9 G/DL (ref 6–8)
RBC # BLD AUTO: 4.21 10E6/UL (ref 3.8–5.2)
SODIUM SERPL-SCNC: 141 MMOL/L (ref 136–145)
TRIGL SERPL-MCNC: 99 MG/DL
WBC # BLD AUTO: 4.2 10E3/UL (ref 4–11)

## 2021-12-06 PROCEDURE — 99396 PREV VISIT EST AGE 40-64: CPT | Mod: 25 | Performed by: FAMILY MEDICINE

## 2021-12-06 PROCEDURE — 83550 IRON BINDING TEST: CPT | Performed by: FAMILY MEDICINE

## 2021-12-06 PROCEDURE — 80061 LIPID PANEL: CPT | Performed by: FAMILY MEDICINE

## 2021-12-06 PROCEDURE — 82306 VITAMIN D 25 HYDROXY: CPT | Performed by: FAMILY MEDICINE

## 2021-12-06 PROCEDURE — 80053 COMPREHEN METABOLIC PANEL: CPT | Performed by: FAMILY MEDICINE

## 2021-12-06 PROCEDURE — 90682 RIV4 VACC RECOMBINANT DNA IM: CPT | Performed by: FAMILY MEDICINE

## 2021-12-06 PROCEDURE — 85027 COMPLETE CBC AUTOMATED: CPT | Performed by: FAMILY MEDICINE

## 2021-12-06 PROCEDURE — 90471 IMMUNIZATION ADMIN: CPT | Performed by: FAMILY MEDICINE

## 2021-12-06 PROCEDURE — 36415 COLL VENOUS BLD VENIPUNCTURE: CPT | Performed by: FAMILY MEDICINE

## 2021-12-06 RX ORDER — CHLORAL HYDRATE 500 MG
1 CAPSULE ORAL
COMMUNITY

## 2021-12-06 ASSESSMENT — MIFFLIN-ST. JEOR: SCORE: 1199.88

## 2021-12-06 NOTE — PATIENT INSTRUCTIONS
Due to follow-up with [facility] dermatology.    Follows with endocrinology with history of thyroid cancer.    Follows with rheumatology for history of connective tissue disorder.    Please ask your rheumatologist if you should get the pneumonia shot before age 65.    Passed on hepatitis A and B shots for now.    When you see GYN have them charge as focused visit, not annual / wellness.      Please see  GYN for he pelvic pain with intercourse.      Health Maintenance   Topic Date Due     ANNUAL REVIEW OF HM ORDERS  Today     HIV SCREENING  Declined     HEPATITIS C SCREENING  Declined     ZOSTER IMMUNIZATION (1 of 2) If you are interested in the new shingles shot, Shingrix, please check with insurance for coverage either in clinic or at a pharmacy. It is a 2 shot series 2-6 months apart.      PAP  At GYN     PREVENTIVE CARE VISIT  Today     INFLUENZA VACCINE (1) Today     MAMMO SCREENING  09/03/2021, ordered     EYE EXAM  11/25/2021     COLORECTAL CANCER SCREENING  02/07/2023     LIPID  Today     ADVANCE CARE PLANNING  Packet given     DTAP/TDAP/TD IMMUNIZATION (3 - Td or Tdap) 08/21/2030     PHQ-2  Completed     COVID-19 Vaccine  Completed     Pneumococcal Vaccine: Pediatrics (0 to 5 Years) and At-Risk Patients (6 to 64 Years)  To ask rheumatology     IPV IMMUNIZATION  Aged Out     MENINGITIS IMMUNIZATION  Aged Out     HEPATITIS B IMMUNIZATION  Declined     Dexa scan:  Due Aug. 2023

## 2021-12-06 NOTE — PROGRESS NOTES
SUBJECTIVE:   CC: Emilia Prince is an 51 year old woman who presents for preventive health visit.     Patient has been advised of split billing requirements and indicates understanding: Yes  Healthy Habits:     Getting at least 3 servings of Calcium per day:  NO    Bi-annual eye exam:  Yes    Dental care twice a year:  Yes    Sleep apnea or symptoms of sleep apnea:  None    Diet:  Regular (no restrictions)    Frequency of exercise:  1 day/week    Taking medications regularly:  Yes    PHQ-2 Total Score: 0    Additional concerns today:  No    HPI: No concerns today but she does bring up some pelvic pain with intercourse that she like to see gynecology for.    Health Maintenance   Topic Date Due     ANNUAL REVIEW OF HM ORDERS  Today     HIV SCREENING  Declined     HEPATITIS C SCREENING  Declined     ZOSTER IMMUNIZATION (1 of 2) If you are interested in the new shingles shot, Shingrix, please check with insurance for coverage either in clinic or at a pharmacy. It is a 2 shot series 2-6 months apart.      PAP  At GYN     PREVENTIVE CARE VISIT  Today     INFLUENZA VACCINE (1) Today     MAMMO SCREENING  09/03/2021, ordered     EYE EXAM  11/25/2021     COLORECTAL CANCER SCREENING  02/07/2023     LIPID  Today     ADVANCE CARE PLANNING  Packet given     DTAP/TDAP/TD IMMUNIZATION (3 - Td or Tdap) 08/21/2030     PHQ-2  Completed     COVID-19 Vaccine  Completed     Pneumococcal Vaccine: Pediatrics (0 to 5 Years) and At-Risk Patients (6 to 64 Years)  To ask rheumatology     IPV IMMUNIZATION  Aged Out     MENINGITIS IMMUNIZATION  Aged Out     HEPATITIS B IMMUNIZATION  Declined     Dexa scan:  Due Aug. 2023              Today's PHQ-2 Score:   PHQ-2 ( 1999 Pfizer) 12/4/2021   Q1: Little interest or pleasure in doing things 0   Q2: Feeling down, depressed or hopeless 0   PHQ-2 Score 0   Q1: Little interest or pleasure in doing things Not at all   Q2: Feeling down, depressed or hopeless Not at all   PHQ-2 Score 0       Abuse:  Current or Past (Physical, Sexual or Emotional) - No  Do you feel safe in your environment? Yes        Social History     Tobacco Use     Smoking status: Never Smoker     Smokeless tobacco: Never Used   Substance Use Topics     Alcohol use: Not on file         Alcohol Use 12/4/2021   Prescreen: >3 drinks/day or >7 drinks/week? No       Reviewed orders with patient.  Reviewed health maintenance and updated orders accordingly - Yes  Lab work is in process    Breast Cancer Screening:    FHS-7:   Breast CA Risk Assessment (FHS-7) 12/4/2021   Did any of your first-degree relatives have breast or ovarian cancer? No   Did any of your relatives have bilateral breast cancer? No   Did any man in your family have breast cancer? No   Did any woman in your family have breast and ovarian cancer? No   Did any woman in your family have breast cancer before age 50 y? No   Do you have 2 or more relatives with breast and/or ovarian cancer? No   Do you have 2 or more relatives with breast and/or bowel cancer? No       Mammogram Screening: Recommended annual mammography  Pertinent mammograms are reviewed under the imaging tab.    History of abnormal Pap smear: NO - age 30- 65 PAP every 3 years recommended     Reviewed and updated as needed this visit by clinical staff  Tobacco  Allergies  Meds             Reviewed and updated as needed this visit by Provider                   Review of Systems  CONSTITUTIONAL: NEGATIVE for fever, chills, change in weight  INTEGUMENTARY/SKIN: NEGATIVE for worrisome rashes, moles or lesions  EYES: NEGATIVE for vision changes or irritation  ENT: NEGATIVE for ear, mouth and throat problems  RESP: NEGATIVE for significant cough or SOB  BREAST: NEGATIVE for masses, tenderness or discharge  CV: NEGATIVE for chest pain, palpitations or peripheral edema  GI: NEGATIVE for nausea, abdominal pain, heartburn, or change in bowel habits  : NEGATIVE for unusual urinary or vaginal symptoms. No vaginal  "bleeding.  MUSCULOSKELETAL: NEGATIVE for significant arthralgias or myalgia  NEURO: NEGATIVE for weakness, dizziness or paresthesias  PSYCHIATRIC: NEGATIVE for changes in mood or affect      OBJECTIVE:   /62 (BP Location: Left arm, Patient Position: Sitting, Cuff Size: Adult Regular)   Pulse 54   Temp 98.4  F (36.9  C) (Oral)   Resp 16   Ht 1.626 m (5' 4\")   Wt 60 kg (132 lb 4 oz)   LMP 12/06/2021   BMI 22.70 kg/m    Physical Exam  GENERAL: healthy, alert and no distress  EYES: Eyes grossly normal to inspection, PERRL and conjunctivae and sclerae normal  HENT: ear canals and TM's normal, nose and mouth without ulcers or lesions  NECK: no adenopathy, no asymmetry, masses, or scars and thyroid normal to palpation  RESP: lungs clear to auscultation - no rales, rhonchi or wheezes  BREAST: normal without masses, tenderness or nipple discharge and no palpable axillary masses or adenopathy  CV: regular rate and rhythm, normal S1 S2, no S3 or S4, no murmur, click or rub, no peripheral edema and peripheral pulses strong  ABDOMEN: soft, nontender, no hepatosplenomegaly, no masses and bowel sounds normal  MS: no gross musculoskeletal defects noted, no edema  SKIN: no suspicious lesions or rashes  NEURO: Normal strength and tone, mentation intact and speech normal  PSYCH: mentation appears normal, affect normal/bright    Diagnostic Test Results:  Labs reviewed in Epic    ASSESSMENT/PLAN:       ICD-10-CM    1. Mixed connective tissue disease (H)  M35.1    2. Screening for HIV (human immunodeficiency virus)  Z11.4    3. Need for hepatitis C screening test  Z11.59    4. Sicca syndrome (H)  M35.00    5. Primary hypercoagulable state (H)  D68.59 Comprehensive metabolic panel (BMP + Alb, Alk Phos, ALT, AST, Total. Bili, TP)     Comprehensive metabolic panel (BMP + Alb, Alk Phos, ALT, AST, Total. Bili, TP)   6. Thyroid cancer (H)  C73    7. Need for prophylactic vaccination and inoculation against influenza  Z23 INFLUENZA " "QUAD, RECOMBINANT, P-FREE (RIV4) (FLUBLOK)     CANCELED: HC FLU VAC PRESRV FREE QUAD SPLIT VIR 3+YRS IM  [63276]   8. Adenomatous polyp of colon, unspecified part of colon  D12.6    9. Hypothyroidism, unspecified type  E03.9    10. Anemia, unspecified type  D64.9 CBC with platelets     Iron and iron binding capacity     CBC with platelets     Iron and iron binding capacity   11. Dysplastic nevi  D23.9    12. Visit for screening mammogram  Z12.31 MA SCREENING DIGITAL BILAT - Future  (s+30)   13. Encounter for preventative adult health care examination  Z00.00 Lipid panel reflex to direct LDL Fasting     Lipid panel reflex to direct LDL Fasting   14. Vitamin D deficiency  E55.9 Vitamin D Deficiency     Vitamin D Deficiency     Due to follow-up with [facility] dermatology.    Follows with endocrinology with history of thyroid cancer.    Follows with rheumatology for history of connective tissue disorder.    Please ask your rheumatologist if you should get the pneumonia shot before age 65.    Passed on hepatitis A and B shots for now.    When you see GYN have them charge as focused visit, not annual / wellness.      Please see  GYN for the pelvic pain with intercourse.    Patient has been advised of split billing requirements and indicates understanding: Yes  COUNSELING:  Reviewed preventive health counseling, as reflected in patient instructions       Advance Care Planning    Estimated body mass index is 22.7 kg/m  as calculated from the following:    Height as of this encounter: 1.626 m (5' 4\").    Weight as of this encounter: 60 kg (132 lb 4 oz).        She reports that she has never smoked. She has never used smokeless tobacco.      Counseling Resources:  ATP IV Guidelines  Pooled Cohorts Equation Calculator  Breast Cancer Risk Calculator  BRCA-Related Cancer Risk Assessment: FHS-7 Tool  FRAX Risk Assessment  ICSI Preventive Guidelines  Dietary Guidelines for Americans, 2010  USDA's MyPlate  ASA " Prophylaxis  Lung CA Screening    Alicia Ghosh MD  [facility]

## 2021-12-07 LAB
DEPRECATED CALCIDIOL+CALCIFEROL SERPL-MC: 44 UG/L (ref 30–80)
IRON SATN MFR SERPL: 27 % (ref 15–46)
IRON SERPL-MCNC: 92 UG/DL (ref 35–180)
TIBC SERPL-MCNC: 340 UG/DL (ref 240–430)

## 2021-12-07 ASSESSMENT — PATIENT HEALTH QUESTIONNAIRE - PHQ9: SUM OF ALL RESPONSES TO PHQ QUESTIONS 1-9: 3

## 2021-12-15 ENCOUNTER — ANCILLARY PROCEDURE (OUTPATIENT)
Dept: MAMMOGRAPHY | Facility: CLINIC | Age: 51
End: 2021-12-15
Attending: FAMILY MEDICINE
Payer: COMMERCIAL

## 2021-12-15 DIAGNOSIS — Z12.31 VISIT FOR SCREENING MAMMOGRAM: ICD-10-CM

## 2021-12-15 PROCEDURE — 77067 SCR MAMMO BI INCL CAD: CPT | Mod: TC | Performed by: RADIOLOGY

## 2021-12-22 ENCOUNTER — TRANSFERRED RECORDS (OUTPATIENT)
Dept: HEALTH INFORMATION MANAGEMENT | Facility: CLINIC | Age: 51
End: 2021-12-22
Payer: COMMERCIAL

## 2022-01-24 ENCOUNTER — TRANSFERRED RECORDS (OUTPATIENT)
Dept: HEALTH INFORMATION MANAGEMENT | Facility: CLINIC | Age: 52
End: 2022-01-24
Payer: COMMERCIAL

## 2022-02-08 ENCOUNTER — MYC MEDICAL ADVICE (OUTPATIENT)
Dept: FAMILY MEDICINE | Facility: CLINIC | Age: 52
End: 2022-02-08
Payer: COMMERCIAL

## 2022-02-08 DIAGNOSIS — F40.243 FEAR OF FLYING: Primary | ICD-10-CM

## 2022-02-08 RX ORDER — ALPRAZOLAM 0.25 MG
TABLET ORAL
Qty: 15 TABLET | Refills: 0 | Status: SHIPPED | OUTPATIENT
Start: 2022-02-08 | End: 2023-01-05

## 2022-03-04 ENCOUNTER — TRANSFERRED RECORDS (OUTPATIENT)
Dept: HEALTH INFORMATION MANAGEMENT | Facility: CLINIC | Age: 52
End: 2022-03-04
Payer: COMMERCIAL

## 2022-03-07 DIAGNOSIS — F41.1 GENERALIZED ANXIETY DISORDER: ICD-10-CM

## 2022-03-08 RX ORDER — ESCITALOPRAM OXALATE 5 MG/1
5 TABLET ORAL DAILY
Qty: 90 TABLET | Refills: 2 | Status: SHIPPED | OUTPATIENT
Start: 2022-03-08 | End: 2022-12-08

## 2022-03-08 NOTE — TELEPHONE ENCOUNTER
"Last Written Prescription Date:  9/28/21  Last Fill Quantity: 90,  # refills: 0   Last office visit provider:  12/6/21     Requested Prescriptions   Pending Prescriptions Disp Refills     escitalopram (LEXAPRO) 5 MG tablet [Pharmacy Med Name: ESCITALOPRAM 5 MG TABLET] 90 tablet 0     Sig: TAKE 1 TABLET BY MOUTH EVERY DAY       SSRIs Protocol Passed - 3/8/2022 10:33 AM        Passed - Recent (12 mo) or future (30 days) visit within the authorizing provider's specialty     Patient has had an office visit with the authorizing provider or a provider within the authorizing providers department within the previous 12 mos or has a future within next 30 days. See \"Patient Info\" tab in inbasket, or \"Choose Columns\" in Meds & Orders section of the refill encounter.              Passed - Medication is active on med list        Passed - Patient is age 18 or older        Passed - No active pregnancy on record        Passed - No positive pregnancy test in last 12 months             Valerio Pichardo RN 03/08/22 10:34 AM  "

## 2022-04-29 ENCOUNTER — TRANSFERRED RECORDS (OUTPATIENT)
Dept: HEALTH INFORMATION MANAGEMENT | Facility: CLINIC | Age: 52
End: 2022-04-29
Payer: COMMERCIAL

## 2022-06-02 ENCOUNTER — TRANSFERRED RECORDS (OUTPATIENT)
Dept: HEALTH INFORMATION MANAGEMENT | Facility: CLINIC | Age: 52
End: 2022-06-02
Payer: COMMERCIAL

## 2022-07-19 ENCOUNTER — TELEPHONE (OUTPATIENT)
Dept: FAMILY MEDICINE | Facility: CLINIC | Age: 52
End: 2022-07-19

## 2022-07-19 DIAGNOSIS — Z78.0 MENOPAUSE: Primary | ICD-10-CM

## 2022-07-19 NOTE — TELEPHONE ENCOUNTER
Patient wonders if she needs another bone density scan  since its been two years and she fx spine a couple months ago.  Please call to advise patient  Ok to leave a detailed message

## 2022-08-08 ENCOUNTER — TRANSFERRED RECORDS (OUTPATIENT)
Dept: HEALTH INFORMATION MANAGEMENT | Facility: CLINIC | Age: 52
End: 2022-08-08

## 2022-08-22 ENCOUNTER — NURSE TRIAGE (OUTPATIENT)
Dept: NURSING | Facility: CLINIC | Age: 52
End: 2022-08-22

## 2022-08-22 ENCOUNTER — HOSPITAL ENCOUNTER (EMERGENCY)
Facility: HOSPITAL | Age: 52
End: 2022-08-22
Payer: COMMERCIAL

## 2022-08-22 NOTE — TELEPHONE ENCOUNTER
Patient notified. She agrees with plan.    Esha Khoury RN  [facility] Nurse Advisor

## 2022-08-22 NOTE — TELEPHONE ENCOUNTER
Nurse Triage SBAR    Is this a 2nd Level Triage? YES, LICENSED PRACTITIONER REVIEW IS REQUIRED    Situation: Pos covid nausea cough and diarrhea.    Passed out last night on the bathroom floor and woke up drenched in sweat. She thinks she passed out a second time while in bed as she had lost control of her bowels in bed and has a gap of memory.    Today improved with cough, nausea and diarrhea as her main symptoms.    Due to how ill she was last night, is she in need of monoclonal antibodies? She is feeling better today.    Protocol says to Discuss with pcp and callback within 1 hour    Background: High risk as she has auto immune issues.    Assessment: see triage    Protocol Recommended Disposition:   Discuss With PCP And Callback By Nurse Within 1 Hour    Recommendation: Pcp to advise     Routed to provider    Does the patient meet one of the following criteria for ADS visit consideration? 16+ years old, with an [facility] PCP     TIP  Providers, please consider if this condition is appropriate for management at one of our Acute and Diagnostic Services sites.     If patient is a good candidate, please use dotphrase <dot>triageresponse and select Refer to ADS to document.          Reason for Disposition    [1] HIGH RISK for severe COVID complications (e.g., weak immune system, age > 64 years, obesity with BMI of 30 or higher, pregnant, chronic lung disease or other chronic medical condition) AND [2] COVID symptoms (e.g., cough, fever)  (Exceptions: Already seen by PCP and no new or worsening symptoms.)    Additional Information    Negative: SEVERE difficulty breathing (e.g., struggling for each breath, speaks in single words)    Negative: Difficult to awaken or acting confused (e.g., disoriented, slurred speech)    Negative: Bluish (or gray) lips or face now    Negative: Shock suspected (e.g., cold/pale/clammy skin, too weak to stand, low BP, rapid pulse)    Negative: Sounds like a life-threatening emergency to the  triager    Negative: SEVERE or constant chest pain or pressure  (Exception: Mild central chest pain, present only when coughing.)    Negative: MODERATE difficulty breathing (e.g., speaks in phrases, SOB even at rest, pulse 100-120)    Negative: Headache and stiff neck (can't touch chin to chest)    Negative: Oxygen level (e.g., pulse oximetry) 90 percent or lower    Negative: Chest pain or pressure  (Exception: MILD central chest pain, present only when coughing)    Negative: Patient sounds very sick or weak to the triager    Negative: MILD difficulty breathing (e.g., minimal/no SOB at rest, SOB with walking, pulse <100)    Negative: Fever > 103 F (39.4 C)    Negative: [1] Fever > 101 F (38.3 C) AND [2] over 60 years of age    Negative: [1] Fever > 100.0 F (37.8 C) AND [2] bedridden (e.g., nursing home patient, CVA, chronic illness, recovering from surgery)    Protocols used: CORONAVIRUS (COVID-19) DIAGNOSED OR [id number]

## 2022-10-12 ENCOUNTER — HOSPITAL ENCOUNTER (OUTPATIENT)
Dept: BONE DENSITY | Facility: HOSPITAL | Age: 52
Discharge: HOME | End: 2022-10-12
Attending: FAMILY MEDICINE | Admitting: FAMILY MEDICINE
Payer: COMMERCIAL

## 2022-10-12 DIAGNOSIS — Z78.0 MENOPAUSE: ICD-10-CM

## 2022-10-12 PROCEDURE — 77080 DXA BONE DENSITY AXIAL: CPT

## 2022-10-17 DIAGNOSIS — M81.6 LOCALIZED OSTEOPOROSIS WITHOUT CURRENT PATHOLOGICAL FRACTURE: Primary | ICD-10-CM

## 2022-11-15 ENCOUNTER — E-VISIT (OUTPATIENT)
Dept: FAMILY MEDICINE | Facility: CLINIC | Age: 52
End: 2022-11-15
Payer: COMMERCIAL

## 2022-11-15 DIAGNOSIS — J01.00 ACUTE MAXILLARY SINUSITIS, RECURRENCE NOT SPECIFIED: Primary | ICD-10-CM

## 2022-11-15 PROCEDURE — 99421 OL DIG E/M SVC 5-10 MIN: CPT | Performed by: FAMILY MEDICINE

## 2022-11-16 DIAGNOSIS — H10.022 PINK EYE DISEASE OF LEFT EYE: Primary | ICD-10-CM

## 2022-11-16 RX ORDER — CIPROFLOXACIN HYDROCHLORIDE 3.5 MG/ML
1-2 SOLUTION/ DROPS TOPICAL 4 TIMES DAILY
Qty: 10 ML | Refills: 0 | Status: SHIPPED | OUTPATIENT
Start: 2022-11-16 | End: 2023-01-05

## 2022-11-16 NOTE — PATIENT INSTRUCTIONS
Sinusitis (Antibiotic Treatment)    The sinuses are air-filled spaces within the bones of the face. They connect to the inside of the nose. Sinusitis is an inflammation of the tissue that lines the sinuses. Sinusitis can occur during a cold. It can also happen due to allergies to pollens and other particles in the air. Sinusitis can cause symptoms of sinus congestion and a feeling of fullness. A sinus infection causes fever, headache, and facial pain. There is often green or yellow fluid draining from the nose or into the back of the throat (post-nasal drip). You have been given antibiotics to treat this condition.   Home care  Take the full course of antibiotics as instructed. Don't stop taking them, even when you feel better.  Drink plenty of water, hot tea, and other liquids as directed by the healthcare provider. This may help thin nasal mucus. It also may help your sinuses drain fluids.  Heat may help soothe painful areas of your face. Use a towel soaked in hot water. Or,  the shower and direct the warm spray onto your face. Using a vaporizer along with a menthol rub at night may also help soothe symptoms.   An expectorant with guaifenesin may help thin nasal mucus and help your sinuses drain fluids. Talk with your provider or pharmacists before taking an over-the-counter (OTC) medicine if you have any questions about it or its side effects..  You can use an OTC decongestant, unless a similar medicine was prescribed to you. Nasal sprays work the fastest. Use one that contains phenylephrine or oxymetazoline. First blow your nose gently. Then use the spray. Don't use these medicines more often than directed on the label. If you do, your symptoms may get worse. You may also take pills that contain pseudoephedrine. Don t use products that combine multiple medicines. This is because side effects may be increased. Read labels. You can also ask the pharmacist for help. (People with high blood pressure  should not use decongestants. They can raise blood pressure.) Talk with your provider or pharmacist if you have any questions about the medicine..  OTC antihistamines may help if allergies contributed to your sinusitis. Talk with your provider or pharmacist if you have any questions about the medicine..  Don't use nasal rinses or irrigation during an acute sinus infection, unless your healthcare provider tells you to. Rinsing may spread the infection to other areas in your sinuses.  Use acetaminophen or ibuprofen to control pain, unless another pain medicine was prescribed to you. If you have chronic liver or kidney disease or ever had a stomach ulcer, talk with your healthcare provider before using these medicines. Never give aspirin to anyone under age 18 who is ill with a fever. It may cause severe liver damage.  Don't smoke. This can make symptoms worse.    Follow-up care  Follow up with your healthcare provider, or as advised.   When to seek medical advice  Call your healthcare provider if any of these occur:   Facial pain or headache that gets worse  Stiff neck  Unusual drowsiness or confusion  Swelling of your forehead or eyelids  Symptoms don't go away in 10 days  Vision problems, such as blurred or double vision  Fever of 100.4 F (38 C) or higher, or as directed by your healthcare provider  Call 911  Call 911 if any of these occur:   Seizure  Trouble breathing  Feeling dizzy or faint  Fingernails, skin or lips look blue, purple , or gray  Prevention  Here are steps you can take to help prevent an infection:   Keep good hand washing habits.  Don t have close contact with people who have sore throats, colds, or other upper respiratory infections.  Don t smoke, and stay away from secondhand smoke.  Stay up to date with of your vaccines.  "[employer]" last reviewed this educational content on 12/1/2019 2000-2021 [employer]. All rights reserved. This information is not intended as a substitute for  professional medical care. Always follow your healthcare professional's instructions.        Dear Emilia Prince    After reviewing your responses, I've been able to diagnose you with?a sinus infection caused by bacteria.?     Based on your responses and diagnosis, I have prescribed Augmentin to treat your symptoms. I have sent this to your pharmacy.?     It is also important to stay well hydrated, get lots of rest and take over-the-counter decongestants,?tylenol?or ibuprofen if you?are able to?take those medications per your primary care provider to help relieve discomfort.?     It is important that you take?all of?your prescribed medication even if your symptoms are improving after a few doses.? Taking?all of?your medicine helps prevent the symptoms from returning.?     If your symptoms worsen, you develop severe headache, vomiting, high fever (>102), or are not improving in 7 days, please contact your primary care provider for an appointment or visit any of our convenient Walk-in Care or Urgent Care Centers to be seen which can be found on our website?here.?     Thanks again for choosing?us?as your health care partner,?   ?  Alicia Ghosh MD?

## 2022-11-21 ENCOUNTER — TRANSFERRED RECORDS (OUTPATIENT)
Dept: HEALTH INFORMATION MANAGEMENT | Facility: CLINIC | Age: 52
End: 2022-11-21

## 2022-12-07 DIAGNOSIS — F41.1 GENERALIZED ANXIETY DISORDER: ICD-10-CM

## 2022-12-08 RX ORDER — ESCITALOPRAM OXALATE 5 MG/1
5 TABLET ORAL DAILY
Qty: 90 TABLET | Refills: 0 | Status: SHIPPED | OUTPATIENT
Start: 2022-12-08 | End: 2023-03-09

## 2022-12-08 NOTE — TELEPHONE ENCOUNTER
"Routing refill request to provider for review/approval because:  Patient needs to be seen because it has been more than 1 year since last office visit.    Last Written Prescription Date:  3/8/22  Last Fill Quantity: 90,  # refills: 2   Last office visit provider:  12/6/21     Requested Prescriptions   Pending Prescriptions Disp Refills     escitalopram (LEXAPRO) 5 MG tablet [Pharmacy Med Name: ESCITALOPRAM 5 MG TABLET] 90 tablet 2     Sig: TAKE 1 TABLET BY MOUTH EVERY DAY       SSRIs Protocol Passed - 12/8/2022  7:30 AM        Passed - Recent (12 mo) or future (30 days) visit within the authorizing provider's specialty     Patient has had an office visit with the authorizing provider or a provider within the authorizing providers department within the previous 12 mos or has a future within next 30 days. See \"Patient Info\" tab in inbasket, or \"Choose Columns\" in Meds & Orders section of the refill encounter.              Passed - Medication is active on med list        Passed - Patient is age 18 or older        Passed - No active pregnancy on record        Passed - No positive pregnancy test in last 12 months             Valerio Pichardo RN 12/08/22 7:30 AM  "

## 2022-12-21 ENCOUNTER — LAB REQUISITION (OUTPATIENT)
Dept: LAB | Facility: CLINIC | Age: 52
End: 2022-12-21

## 2022-12-21 DIAGNOSIS — Z12.4 ENCOUNTER FOR SCREENING FOR MALIGNANT NEOPLASM OF CERVIX: ICD-10-CM

## 2022-12-21 PROCEDURE — 87624 HPV HI-RISK TYP POOLED RSLT: CPT | Performed by: OBSTETRICS & GYNECOLOGY

## 2022-12-21 PROCEDURE — G0145 SCR C/V CYTO,THINLAYER,RESCR: HCPCS | Performed by: OBSTETRICS & GYNECOLOGY

## 2022-12-23 LAB
BKR LAB AP GYN ADEQUACY: NORMAL
BKR LAB AP GYN INTERPRETATION: NORMAL
BKR LAB AP HPV REFLEX: NORMAL
BKR LAB AP LMP: NORMAL
BKR LAB AP PREVIOUS ABNL DX: NORMAL
BKR LAB AP PREVIOUS ABNORMAL: NORMAL
PATH REPORT.COMMENTS IMP SPEC: NORMAL
PATH REPORT.COMMENTS IMP SPEC: NORMAL
PATH REPORT.RELEVANT HX SPEC: NORMAL

## 2022-12-26 LAB
HUMAN PAPILLOMA VIRUS 16 DNA: NEGATIVE
HUMAN PAPILLOMA VIRUS 18 DNA: NEGATIVE
HUMAN PAPILLOMA VIRUS FINAL DIAGNOSIS: NORMAL
HUMAN PAPILLOMA VIRUS OTHER HR: NEGATIVE

## 2022-12-27 ENCOUNTER — ANCILLARY PROCEDURE (OUTPATIENT)
Dept: MAMMOGRAPHY | Facility: CLINIC | Age: 52
End: 2022-12-27
Attending: OBSTETRICS & GYNECOLOGY
Payer: COMMERCIAL

## 2022-12-27 DIAGNOSIS — Z12.31 VISIT FOR SCREENING MAMMOGRAM: ICD-10-CM

## 2022-12-27 PROCEDURE — 77067 SCR MAMMO BI INCL CAD: CPT

## 2023-01-05 ENCOUNTER — E-VISIT (OUTPATIENT)
Dept: FAMILY MEDICINE | Facility: CLINIC | Age: 53
End: 2023-01-05
Payer: COMMERCIAL

## 2023-01-05 DIAGNOSIS — R91.8 PULMONARY NODULES: Primary | ICD-10-CM

## 2023-01-05 PROCEDURE — 99421 OL DIG E/M SVC 5-10 MIN: CPT | Performed by: FAMILY MEDICINE

## 2023-01-10 ENCOUNTER — ANCILLARY PROCEDURE (OUTPATIENT)
Dept: MAMMOGRAPHY | Facility: CLINIC | Age: 53
End: 2023-01-10
Attending: OBSTETRICS & GYNECOLOGY
Payer: COMMERCIAL

## 2023-01-10 DIAGNOSIS — N64.89 BREAST ASYMMETRY: ICD-10-CM

## 2023-01-10 PROCEDURE — 77061 BREAST TOMOSYNTHESIS UNI: CPT | Mod: RT

## 2023-02-13 ENCOUNTER — TRANSFERRED RECORDS (OUTPATIENT)
Dept: HEALTH INFORMATION MANAGEMENT | Facility: CLINIC | Age: 53
End: 2023-02-13

## 2023-03-08 DIAGNOSIS — F41.1 GENERALIZED ANXIETY DISORDER: ICD-10-CM

## 2023-03-09 RX ORDER — ESCITALOPRAM OXALATE 5 MG/1
5 TABLET ORAL DAILY
Qty: 90 TABLET | Refills: 0 | Status: SHIPPED | OUTPATIENT
Start: 2023-03-09 | End: 2023-03-31

## 2023-03-09 NOTE — TELEPHONE ENCOUNTER
"Last Written Prescription Date:  12/8/2022  Last Fill Quantity: 90,  # refills: 0   Last office visit provider:  12/6/2021     Requested Prescriptions   Pending Prescriptions Disp Refills     escitalopram (LEXAPRO) 5 MG tablet 90 tablet 0     Sig: Take 1 tablet (5 mg) by mouth daily       SSRIs Protocol Passed - 3/8/2023 11:29 AM        Passed - Recent (12 mo) or future (30 days) visit within the authorizing provider's specialty     Patient has had an office visit with the authorizing provider or a provider within the authorizing providers department within the previous 12 mos or has a future within next 30 days. See \"Patient Info\" tab in inbasket, or \"Choose Columns\" in Meds & Orders section of the refill encounter.              Passed - Medication is active on med list        Passed - Patient is age 18 or older        Passed - No active pregnancy on record        Passed - No positive pregnancy test in last 12 months             Izabella Harper RN 03/09/23 4:16 PM  "

## 2023-03-30 DIAGNOSIS — F41.1 GENERALIZED ANXIETY DISORDER: ICD-10-CM

## 2023-03-31 RX ORDER — ESCITALOPRAM OXALATE 5 MG/1
5 TABLET ORAL DAILY
Qty: 90 TABLET | Refills: 0 | Status: SHIPPED | OUTPATIENT
Start: 2023-03-31 | End: 2023-05-18

## 2023-04-23 ENCOUNTER — HEALTH MAINTENANCE LETTER (OUTPATIENT)
Age: 53
End: 2023-04-23

## 2023-05-16 DIAGNOSIS — F41.1 GENERALIZED ANXIETY DISORDER: ICD-10-CM

## 2023-05-18 RX ORDER — ESCITALOPRAM OXALATE 5 MG/1
TABLET ORAL
Qty: 90 TABLET | Refills: 3 | Status: SHIPPED | OUTPATIENT
Start: 2023-05-18 | End: 2024-04-22

## 2023-05-18 NOTE — TELEPHONE ENCOUNTER
"Due to be seen    Last Written Prescription Date:  3/31/23  Last Fill Quantity: 90,  # refills: 0   Last office visit provider:  12/6/21     Requested Prescriptions   Pending Prescriptions Disp Refills     escitalopram (LEXAPRO) 5 MG tablet [Pharmacy Med Name: Escitalopram Oxalate 5 MG Oral Tablet] 90 tablet 3     Sig: TAKE 1 TABLET BY MOUTH DAILY       SSRIs Protocol Passed - 5/16/2023  9:19 PM        Passed - Recent (12 mo) or future (30 days) visit within the authorizing provider's specialty     Patient has had an office visit with the authorizing provider or a provider within the authorizing providers department within the previous 12 mos or has a future within next 30 days. See \"Patient Info\" tab in inbasket, or \"Choose Columns\" in Meds & Orders section of the refill encounter.              Passed - Medication is active on med list        Passed - Patient is age 18 or older        Passed - No active pregnancy on record        Passed - No positive pregnancy test in last 12 months             Lillie Munguia RN 05/17/23 8:14 PM  "

## 2023-08-23 ENCOUNTER — OFFICE VISIT (OUTPATIENT)
Dept: FAMILY MEDICINE | Facility: CLINIC | Age: 53
End: 2023-08-23
Payer: COMMERCIAL

## 2023-08-23 VITALS
HEIGHT: 64 IN | WEIGHT: 137.4 LBS | OXYGEN SATURATION: 98 % | SYSTOLIC BLOOD PRESSURE: 106 MMHG | DIASTOLIC BLOOD PRESSURE: 70 MMHG | RESPIRATION RATE: 16 BRPM | HEART RATE: 73 BPM | BODY MASS INDEX: 23.46 KG/M2 | TEMPERATURE: 97.2 F

## 2023-08-23 DIAGNOSIS — R91.8 PULMONARY NODULES: ICD-10-CM

## 2023-08-23 DIAGNOSIS — Z12.11 SCREEN FOR COLON CANCER: Primary | ICD-10-CM

## 2023-08-23 DIAGNOSIS — M81.8 OTHER OSTEOPOROSIS WITHOUT CURRENT PATHOLOGICAL FRACTURE: ICD-10-CM

## 2023-08-23 PROCEDURE — 99213 OFFICE O/P EST LOW 20 MIN: CPT | Performed by: FAMILY MEDICINE

## 2023-08-23 RX ORDER — ALENDRONATE SODIUM 70 MG/1
70 TABLET ORAL
COMMUNITY
Start: 2023-03-06 | End: 2024-03-05

## 2023-08-23 NOTE — PATIENT INSTRUCTIONS
CT scan of the chest ordered.  Can call [phone] to set it up.    Ordered the CT without contrast.  When you set the appointment just double check they are not going to use any contrast.  If they feel they might need contrast take Benadryl 50 mg the night before the CT another 50 mg 2 hours before the CT.    Colonoscopy ordered.    Physical set in October.

## 2023-08-23 NOTE — PROGRESS NOTES
Assessment & Plan       ICD-10-CM    1. Screen for colon cancer  Z12.11 Colonoscopy Screening  Referral      2. Pulmonary nodules  R91.8       3. Other osteoporosis without current pathological fracture  M81.8         CT scan of the chest ordered.  Can call [phone] to set it up.    Ordered the CT without contrast.  When you set the appointment just double check they are not going to use any contrast.  If they feel they might need contrast take Benadryl 50 mg the night before the CT another 50 mg 2 hours before the CT.    Colonoscopy ordered.    Physical set in October.      20 minutes spent by me on the date of the encounter doing chart review, history and exam, documentation and further activities per the note           Alicia Ghosh MD  [facility]    Martina Nieto is a 53 year old, presenting for the following health issues:  Results (Would like to get a Ct of the chest. /They found a Lump on the lungs last year. )        8/23/2023     1:15 PM   Additional Questions   Roomed by Daysi GRESHAM CMA       History of Present Illness       Reason for visit:  Lung nodule    She eats 0-1 servings of fruits and vegetables daily.She consumes 0 sweetened beverage(s) daily.She exercises with enough effort to increase her heart rate 9 or less minutes per day.  She exercises with enough effort to increase her heart rate 3 or less days per week.   She is taking medications regularly.     Chest CT: Patient had pulmonary nodules on the chest CT August 2022.  Was recommended to have a repeat CT in 1 year.  She is wondering if she needs contrast or not.  Years ago had a chest CT about 2007 or 2008 with contrast and felt awful after.  [place] like heart was going to explode, heart racing and pumping hard.  When she had her CT at the emergency room in 2022 they thought that reaction was likely related to panic or anxiety and not an actual contrast reaction.  They did do a CT with  "contrast and premedicated her with 25 mg of IV Benadryl and she did fine.    Osteoporosis: She has seen endocrinology and is now on Fosamax.  She has had a vertebral compression fracture.            Review of Systems         Objective    /70 (BP Location: Left arm, Patient Position: Sitting, Cuff Size: Adult Large)   Pulse 73   Temp 97.2  F (36.2  C) (Oral)   Resp 16   Ht 1.626 m (5' 4\")   Wt 62.3 kg (137 lb 6.4 oz)   LMP 12/06/2021   SpO2 98%   BMI 23.58 kg/m    Body mass index is 23.58 kg/m .  Physical Exam   GENERAL: healthy, alert and no distress                      "

## 2023-09-05 ENCOUNTER — TRANSFERRED RECORDS (OUTPATIENT)
Dept: HEALTH INFORMATION MANAGEMENT | Facility: CLINIC | Age: 53
End: 2023-09-05
Payer: COMMERCIAL

## 2023-10-04 ENCOUNTER — HOSPITAL ENCOUNTER (OUTPATIENT)
Dept: CT IMAGING | Facility: HOSPITAL | Age: 53
Discharge: HOME | End: 2023-10-04
Attending: FAMILY MEDICINE | Admitting: FAMILY MEDICINE
Payer: COMMERCIAL

## 2023-10-04 DIAGNOSIS — R91.8 PULMONARY NODULES: ICD-10-CM

## 2023-10-04 PROCEDURE — 71250 CT THORAX DX C-: CPT

## 2023-10-05 ENCOUNTER — OFFICE VISIT (OUTPATIENT)
Dept: FAMILY MEDICINE | Facility: CLINIC | Age: 53
End: 2023-10-05
Payer: COMMERCIAL

## 2023-10-05 VITALS
DIASTOLIC BLOOD PRESSURE: 66 MMHG | HEIGHT: 64 IN | BODY MASS INDEX: 23.32 KG/M2 | RESPIRATION RATE: 16 BRPM | HEART RATE: 67 BPM | OXYGEN SATURATION: 98 % | SYSTOLIC BLOOD PRESSURE: 94 MMHG | TEMPERATURE: 98.2 F | WEIGHT: 136.6 LBS

## 2023-10-05 DIAGNOSIS — Z12.11 SCREEN FOR COLON CANCER: ICD-10-CM

## 2023-10-05 DIAGNOSIS — Z12.31 VISIT FOR SCREENING MAMMOGRAM: ICD-10-CM

## 2023-10-05 DIAGNOSIS — D68.59 PRIMARY HYPERCOAGULABLE STATE (H): ICD-10-CM

## 2023-10-05 DIAGNOSIS — E55.9 VITAMIN D DEFICIENCY: ICD-10-CM

## 2023-10-05 DIAGNOSIS — R51.9 MORNING HEADACHE: ICD-10-CM

## 2023-10-05 DIAGNOSIS — E03.9 HYPOTHYROIDISM, UNSPECIFIED TYPE: ICD-10-CM

## 2023-10-05 DIAGNOSIS — M35.00 SICCA SYNDROME (H): ICD-10-CM

## 2023-10-05 DIAGNOSIS — Z00.00 ENCOUNTER FOR PREVENTATIVE ADULT HEALTH CARE EXAMINATION: Primary | ICD-10-CM

## 2023-10-05 DIAGNOSIS — C73 THYROID CANCER (H): ICD-10-CM

## 2023-10-05 DIAGNOSIS — R91.8 PULMONARY NODULES: ICD-10-CM

## 2023-10-05 DIAGNOSIS — D64.9 ANEMIA, UNSPECIFIED TYPE: ICD-10-CM

## 2023-10-05 DIAGNOSIS — M81.8 OTHER OSTEOPOROSIS WITHOUT CURRENT PATHOLOGICAL FRACTURE: ICD-10-CM

## 2023-10-05 DIAGNOSIS — R53.83 OTHER FATIGUE: ICD-10-CM

## 2023-10-05 DIAGNOSIS — D12.6 ADENOMATOUS POLYP OF COLON, UNSPECIFIED PART OF COLON: ICD-10-CM

## 2023-10-05 LAB
BASO+EOS+MONOS # BLD AUTO: NORMAL 10*3/UL
BASO+EOS+MONOS NFR BLD AUTO: NORMAL %
BASOPHILS # BLD AUTO: 0 10E3/UL (ref 0–0.2)
BASOPHILS NFR BLD AUTO: 1 %
EOSINOPHIL # BLD AUTO: 0.2 10E3/UL (ref 0–0.7)
EOSINOPHIL NFR BLD AUTO: 3 %
ERYTHROCYTE [DISTWIDTH] IN BLOOD BY AUTOMATED COUNT: 12.9 % (ref 10–15)
HCT VFR BLD AUTO: 40.2 % (ref 35–47)
HGB BLD-MCNC: 13.2 G/DL (ref 11.7–15.7)
IMM GRANULOCYTES # BLD: 0 10E3/UL
IMM GRANULOCYTES NFR BLD: 0 %
LYMPHOCYTES # BLD AUTO: 1.8 10E3/UL (ref 0.8–5.3)
LYMPHOCYTES NFR BLD AUTO: 32 %
MCH RBC QN AUTO: 30.2 PG (ref 26.5–33)
MCHC RBC AUTO-ENTMCNC: 32.8 G/DL (ref 31.5–36.5)
MCV RBC AUTO: 92 FL (ref 78–100)
MONOCYTES # BLD AUTO: 0.5 10E3/UL (ref 0–1.3)
MONOCYTES NFR BLD AUTO: 8 %
NEUTROPHILS # BLD AUTO: 3.1 10E3/UL (ref 1.6–8.3)
NEUTROPHILS NFR BLD AUTO: 57 %
PLATELET # BLD AUTO: 179 10E3/UL (ref 150–450)
RBC # BLD AUTO: 4.37 10E6/UL (ref 3.8–5.2)
RETICS # AUTO: 0.07 10E6/UL (ref 0.01–0.11)
RETICS/RBC NFR AUTO: 1.5 % (ref 0.8–2.7)
WBC # BLD AUTO: 5.6 10E3/UL (ref 4–11)

## 2023-10-05 PROCEDURE — 83550 IRON BINDING TEST: CPT | Performed by: FAMILY MEDICINE

## 2023-10-05 PROCEDURE — 99396 PREV VISIT EST AGE 40-64: CPT | Performed by: FAMILY MEDICINE

## 2023-10-05 PROCEDURE — 85025 COMPLETE CBC W/AUTO DIFF WBC: CPT | Performed by: FAMILY MEDICINE

## 2023-10-05 PROCEDURE — 85045 AUTOMATED RETICULOCYTE COUNT: CPT | Performed by: FAMILY MEDICINE

## 2023-10-05 PROCEDURE — 91320 SARSCV2 VAC 30MCG TRS-SUC IM: CPT | Performed by: FAMILY MEDICINE

## 2023-10-05 PROCEDURE — 36415 COLL VENOUS BLD VENIPUNCTURE: CPT | Performed by: FAMILY MEDICINE

## 2023-10-05 PROCEDURE — 80053 COMPREHEN METABOLIC PANEL: CPT | Performed by: FAMILY MEDICINE

## 2023-10-05 PROCEDURE — 82306 VITAMIN D 25 HYDROXY: CPT | Performed by: FAMILY MEDICINE

## 2023-10-05 PROCEDURE — 82607 VITAMIN B-12: CPT | Performed by: FAMILY MEDICINE

## 2023-10-05 PROCEDURE — 90480 ADMN SARSCOV2 VAC 1/ONLY CMP: CPT | Performed by: FAMILY MEDICINE

## 2023-10-05 PROCEDURE — 85060 BLOOD SMEAR INTERPRETATION: CPT | Performed by: PATHOLOGY

## 2023-10-05 PROCEDURE — 83540 ASSAY OF IRON: CPT | Performed by: FAMILY MEDICINE

## 2023-10-05 PROCEDURE — 80061 LIPID PANEL: CPT | Performed by: FAMILY MEDICINE

## 2023-10-05 PROCEDURE — 99214 OFFICE O/P EST MOD 30 MIN: CPT | Mod: 25 | Performed by: FAMILY MEDICINE

## 2023-10-05 RX ORDER — LEVOTHYROXINE SODIUM 75 UG/1
TABLET ORAL
COMMUNITY

## 2023-10-05 ASSESSMENT — ENCOUNTER SYMPTOMS
WEAKNESS: 0
DIARRHEA: 0
BREAST MASS: 0
FATIGUE: 1
ABDOMINAL PAIN: 0
SHORTNESS OF BREATH: 0
FREQUENCY: 0
DYSURIA: 0
MYALGIAS: 0
NAUSEA: 0
HEADACHES: 1
JOINT SWELLING: 0
HEARTBURN: 0
EYE PAIN: 0
CONSTIPATION: 0
SORE THROAT: 0
PARESTHESIAS: 0
COUGH: 0
CHILLS: 0
HEMATOCHEZIA: 0
NERVOUS/ANXIOUS: 0
FEVER: 0
PALPITATIONS: 0
ARTHRALGIAS: 0
DIZZINESS: 0
HEMATURIA: 0

## 2023-10-05 NOTE — PROGRESS NOTES
SUBJECTIVE:   CC: Emilia is an 53 year old who presents for preventive health visit.       10/5/2023     4:30 PM   Additional Questions   Roomed by ERNO Villanueva CMA([facility])       Healthy Habits:     Getting at least 3 servings of Calcium per day:  Yes    Bi-annual eye exam:  Yes    Dental care twice a year:  Yes    Sleep apnea or symptoms of sleep apnea:  None    Diet:  Regular (no restrictions)    Frequency of exercise:  None    Taking medications regularly:  Yes    Medication side effects:  Not applicable    Additional concerns today:  Yes  Fatigue  Associated symptoms include fatigue and headaches. Pertinent negatives include no abdominal pain, arthralgias, chest pain, chills, congestion, coughing, fever, joint swelling, myalgias, nausea, rash, sore throat or weakness.     Osteoporosis: No chronology ordered DEXA scan for December since then she will have been on Foasamax for 1 year.    Pulmonary nodules: She did recently have a CT.  It did say if she were high risk to repeat in a year.  She does have a smoking history.    History of thyroid cancer and hypothyroidism: On Synthroid.    Tired: Has been feeling very tired and foggy over the last couple of weeks.  She has not been told that she snores or has any pauses in her breathing.  She does not feel depressed.  When she feels really fatigued she will have a headache.    Headache: She will wake up with headache for 2 weeks.  Feels he is hydrated.  Eating enough.  No numbness or tingling in her arms or legs, no arm or leg weakness.  She is feeling a little mentally foggy.    Memory:  When putting in phone password, she went blank and could not remember password.  Got worked up over this and started crying.  Never tigist.  Took about 20 minutes to remember it.  Not a particularly stressful day.  Feels like her memory is not great.  Example if  does not remember watching a movie 2 years ago.  Not gotten lost driving, but fleeting thing of where she wonders if on the  right road.   told her she has a terible memory, but always has.  Aunts and uncles have dementia.  (Alzheimers)      Health Maintenance   Topic Date Due    HEPATITIS B IMMUNIZATION (1 of 3 - 3-dose series) Declined    HIV SCREENING  NA    HEPATITIS C SCREENING  NA    ZOSTER IMMUNIZATION (1 of 2) If you are interested in the new shingles shot, Shingrix, please check with insurance for coverage either in clinic or at a pharmacy. It is a 2 shot series 2-6 months apart.     TSH W/FREE T4 REFLEX  Done recently    ANNUAL REVIEW OF HM ORDERS  Today    COLORECTAL CANCER SCREENING  Ordered    INFLUENZA VACCINE (1) In 2 weeks    COVID-19 Vaccine ( season) Today    YEARLY PREVENTIVE VISIT  Today    MAMMO SCREENING  Ordered for Dec.    EYE EXAM  2024    ADVANCE CARE PLANNING  Has packet at home    LIPID  Today    HPV TEST  At GYN    PAP  At GYN    DTAP/TDAP/TD IMMUNIZATION (3 - Td or Tdap) 2030    PHQ-2 (once per calendar year)  Completed    Pneumococcal Vaccine: Pediatrics (0 to 5 Years) and At-Risk Patients (6 to 64 Years)  Will think about    IPV IMMUNIZATION  Aged Out    HPV IMMUNIZATION  Aged Out    MENINGITIS IMMUNIZATION  Aged Out                       Social History     Tobacco Use    Smoking status: Former     Types: Cigarettes     Passive exposure: Never    Smokeless tobacco: Never   Substance Use Topics    Alcohol use: Not on file             10/5/2023     4:24 PM   Alcohol Use   Prescreen: >3 drinks/day or >7 drinks/week? No     Reviewed orders with patient.  Reviewed health maintenance and updated orders accordingly - Yes  Lab work is in process    Breast Cancer Screenin/4/2021     7:46 PM 12/15/2021     2:37 PM   Breast CA Risk Assessment (FHS-7)   Do you have a family history of breast, colon, or ovarian cancer? Yes No / Unknown         Mammogram Screening: Recommended annual mammography  Pertinent mammograms are reviewed under the imaging tab.    History of abnormal Pap  "smear: Sees GYN      Latest Ref Rng & Units 12/21/2022     4:30 PM   PAP / HPV   PAP  Negative for Intraepithelial Lesion or Malignancy (NILM)    HPV 16 DNA Negative Negative    HPV 18 DNA Negative Negative    Other HR HPV Negative Negative      Reviewed and updated as needed this visit by clinical staff   Tobacco   Meds              Reviewed and updated as needed this visit by Provider                     Review of Systems   Constitutional:  Positive for fatigue. Negative for chills and fever.   HENT:  Positive for hearing loss. Negative for congestion, ear pain and sore throat.    Eyes:  Positive for visual disturbance. Negative for pain.   Respiratory:  Negative for cough and shortness of breath.    Cardiovascular:  Negative for chest pain, palpitations and peripheral edema.   Gastrointestinal:  Negative for abdominal pain, constipation, diarrhea, heartburn, hematochezia and nausea.   Breasts:  Negative for tenderness, breast mass and discharge.   Genitourinary:  Negative for dysuria, frequency, genital sores, hematuria, pelvic pain, urgency, vaginal bleeding and vaginal discharge.   Musculoskeletal:  Negative for arthralgias, joint swelling and myalgias.   Skin:  Negative for rash.   Neurological:  Positive for headaches. Negative for dizziness, weakness and paresthesias.   Psychiatric/Behavioral:  Negative for mood changes. The patient is not nervous/anxious.           OBJECTIVE:   BP 94/66   Pulse 67   Temp 98.2  F (36.8  C) (Oral)   Resp 16   Ht 1.619 m (5' 3.75\")   Wt 62 kg (136 lb 9.6 oz)   LMP 12/06/2021   SpO2 98%   BMI 23.63 kg/m    Physical Exam  GENERAL: healthy, alert and no distress  NECK: no adenopathy, no asymmetry, masses, or scars and thyroid normal to palpation  RESP: lungs clear to auscultation - no rales, rhonchi or wheezes  CV: regular rate and rhythm, normal S1 S2, no S3 or S4, no murmur, click or rub, no peripheral edema and peripheral pulses strong  ABDOMEN: soft, nontender, no " hepatosplenomegaly, no masses and bowel sounds normal  MS: no gross musculoskeletal defects noted, no edema    Diagnostic Test Results:  Labs reviewed in Epic    ASSESSMENT/PLAN:       ICD-10-CM    1. Encounter for preventative adult health care examination  Z00.00 Lipid panel reflex to direct LDL Non-fasting     Lipid panel reflex to direct LDL Non-fasting      2. Screen for colon cancer  Z12.11 Colonoscopy Screening  Referral      3. Sicca syndrome (H24)  M35.00       4. Primary hypercoagulable state (H24)  D68.59       5. Thyroid cancer (H)  C73       6. Other osteoporosis without current pathological fracture  M81.8       7. Anemia, unspecified type  D64.9       8. Morning headache  R51.9 CT Head w/o Contrast     CTA Head Neck with Contrast      9. Pulmonary nodules  R91.8 CT Chest w/o Contrast      10. Adenomatous polyp of colon, unspecified part of colon  D12.6 Adult GI  Referral - Procedure Only      11. Hypothyroidism, unspecified type  E03.9       12. Visit for screening mammogram  Z12.31 MA Screen Bilateral w/Alex      13. Vitamin D deficiency  E55.9 Vitamin D Deficiency     Vitamin D Deficiency      14. Other fatigue  R53.83 Lab Blood Morphology Pathologist Review     Iron and iron binding capacity     Vitamin B12     Comprehensive metabolic panel (BMP + Alb, Alk Phos, ALT, AST, Total. Bili, TP)     Lab Blood Morphology Pathologist Review     Iron and iron binding capacity     Vitamin B12     Comprehensive metabolic panel (BMP + Alb, Alk Phos, ALT, AST, Total. Bili, TP)        CT chest ordered for around October 4, 2024 to recheck the pulmonary nodules because you had a history of smoking.    If you are interested in the new shingles shot, Shingrix, please check with insurance for coverage either in clinic or at a pharmacy. It is a 2 shot series 2-6 months apart. Want 2 weeks from other shot.    Flu shot in 2 weeks or after.  Nurse apt is fine.    Recommend 64-96 oz of non-caffenated  fluid per day.    Ordered head CT and CTA.      Memory issues such as getting lost driving, leaving the stove on, forgetting familiar people, getting taken advantage of money or repeating yourself frequently then we do want to do some formal neuropsych testing.    Shot with your autoimmune disease of unknown diagnosis.    CT is with contrast.  Please take 50 mg of Benadryl 2 hours before the head CT.  If it makes you tired you want a .    Patient has been advised of split billing requirements and indicates understanding: Yes      COUNSELING:  Reviewed preventive health counseling, as reflected in patient instructions       Osteoporosis prevention/bone health        She reports that she has quit smoking. Her smoking use included cigarettes. She has never been exposed to tobacco smoke. She has never used smokeless tobacco.          Alicia Ghosh MD  [facility]

## 2023-10-05 NOTE — PROGRESS NOTES
Prior to immunization administration, verified patients identity using patient s name and date of birth. Please see Immunization Activity for additional information.     Screening Questionnaire for Adult Immunization    Are you sick today?   No   Do you have allergies to medications, food, a vaccine component or latex?   Yes   Have you ever had a serious reaction after receiving a vaccination?   No   Do you have a long-term health problem with heart, lung, kidney, or metabolic disease (e.g., diabetes), asthma, a blood disorder, no spleen, complement component deficiency, a cochlear implant, or a spinal fluid leak?  Are you on long-term aspirin therapy?   Yes   Do you have cancer, leukemia, HIV/AIDS, or any other immune system problem?  Yes   Do you have a parent, brother, or sister with an immune system problem?   Yes   In the past 3 months, have you taken medications that affect  your immune system, such as prednisone, other steroids, or anticancer drugs; drugs for the treatment of rheumatoid arthritis, Crohn s disease, or psoriasis; or have you had radiation treatments?   No   Have you had a seizure, or a brain or other nervous system problem?   No   During the past year, have you received a transfusion of blood or blood    products, or been given immune (gamma) globulin or antiviral drug?   No   For women: Are you pregnant or is there a chance you could become       pregnant during the next month?   No   Have you received any vaccinations in the past 4 weeks?   No     Immunization questionnaire was positive for at least one answer.  Notified .      Patient instructed to remain in clinic for 15 minutes afterwards, and to report any adverse reactions.     Screening performed by Libra Villanueva MA on 10/5/2023 at 5:51 PM.

## 2023-10-05 NOTE — PATIENT INSTRUCTIONS
CT chest ordered for around October 4, 2024 to recheck the pulmonary nodules because you had a history of smoking.    If you are interested in the new shingles shot, Shingrix, please check with insurance for coverage either in clinic or at a pharmacy. It is a 2 shot series 2-6 months apart. Want 2 weeks from other shot.    Flu shot in 2 weeks or after.  Nurse apt is fine.    Recommend 64-96 oz of non-caffenated fluid per day.    Ordered head CT and CTA.      Memory issues such as getting lost driving, leaving the stove on, forgetting familiar people, getting taken advantage of money or repeating yourself frequently then we do want to do some formal neuropsych testing.    Shot with your autoimmune disease of unknown diagnosis.    CT is with contrast.  Please take 50 mg of Benadryl 2 hours before the head CT.  If it makes you tired you want a .      Health Maintenance   Topic Date Due    HEPATITIS B IMMUNIZATION (1 of 3 - 3-dose series) Declined    HIV SCREENING  NA    HEPATITIS C SCREENING  NA    ZOSTER IMMUNIZATION (1 of 2) If you are interested in the new shingles shot, Shingrix, please check with insurance for coverage either in clinic or at a pharmacy. It is a 2 shot series 2-6 months apart.     TSH W/FREE T4 REFLEX  Done recently    ANNUAL REVIEW OF HM ORDERS  Today    COLORECTAL CANCER SCREENING  Ordered    INFLUENZA VACCINE (1) In 2 weeks    COVID-19 Vaccine (6 - 2023-24 season) Today    YEARLY PREVENTIVE VISIT  Today    MAMMO SCREENING  Ordered for Dec.    EYE EXAM  02/13/2024    ADVANCE CARE PLANNING  Has packet at home    LIPID  Today    HPV TEST  At GYN    PAP  At GYN    DTAP/TDAP/TD IMMUNIZATION (3 - Td or Tdap) 08/21/2030    PHQ-2 (once per calendar year)  Completed    Pneumococcal Vaccine: Pediatrics (0 to 5 Years) and At-Risk Patients (6 to 64 Years)  Will think about    IPV IMMUNIZATION  Aged Out    HPV IMMUNIZATION  Aged Out    MENINGITIS IMMUNIZATION  Aged Out

## 2023-10-06 LAB
ALBUMIN SERPL BCG-MCNC: 4.8 G/DL (ref 3.5–5.2)
ALP SERPL-CCNC: 56 U/L (ref 35–104)
ALT SERPL W P-5'-P-CCNC: 20 U/L (ref 0–50)
ANION GAP SERPL CALCULATED.3IONS-SCNC: 12 MMOL/L (ref 7–15)
AST SERPL W P-5'-P-CCNC: 29 U/L (ref 0–45)
BILIRUB SERPL-MCNC: 0.3 MG/DL
BUN SERPL-MCNC: 20 MG/DL (ref 6–20)
CALCIUM SERPL-MCNC: 9.6 MG/DL (ref 8.6–10)
CHLORIDE SERPL-SCNC: 103 MMOL/L (ref 98–107)
CHOLEST SERPL-MCNC: 184 MG/DL
CREAT SERPL-MCNC: 0.87 MG/DL (ref 0.51–0.95)
DEPRECATED HCO3 PLAS-SCNC: 26 MMOL/L (ref 22–29)
EGFRCR SERPLBLD CKD-EPI 2021: 79 ML/MIN/1.73M2
GLUCOSE SERPL-MCNC: 83 MG/DL (ref 70–99)
HDLC SERPL-MCNC: 72 MG/DL
IRON BINDING CAPACITY (ROCHE): 354 UG/DL (ref 240–430)
IRON SATN MFR SERPL: 21 % (ref 15–46)
IRON SERPL-MCNC: 74 UG/DL (ref 37–145)
LDLC SERPL CALC-MCNC: 93 MG/DL
NONHDLC SERPL-MCNC: 112 MG/DL
PATH REPORT.COMMENTS IMP SPEC: NORMAL
PATH REPORT.COMMENTS IMP SPEC: NORMAL
PATH REPORT.FINAL DX SPEC: NORMAL
PATH REPORT.RELEVANT HX SPEC: NORMAL
POTASSIUM SERPL-SCNC: 4.4 MMOL/L (ref 3.4–5.3)
PROT SERPL-MCNC: 7.5 G/DL (ref 6.4–8.3)
SODIUM SERPL-SCNC: 141 MMOL/L (ref 135–145)
TRIGL SERPL-MCNC: 95 MG/DL
VIT B12 SERPL-MCNC: 1105 PG/ML (ref 232–1245)
VIT D+METAB SERPL-MCNC: 45 NG/ML (ref 20–50)

## 2023-10-31 ENCOUNTER — PATIENT OUTREACH (OUTPATIENT)
Dept: GASTROENTEROLOGY | Facility: CLINIC | Age: 53
End: 2023-10-31
Payer: COMMERCIAL

## 2023-12-11 ENCOUNTER — PATIENT OUTREACH (OUTPATIENT)
Dept: CARE COORDINATION | Facility: CLINIC | Age: 53
End: 2023-12-11
Payer: COMMERCIAL

## 2023-12-29 ENCOUNTER — HOSPITAL ENCOUNTER (OUTPATIENT)
Dept: BONE DENSITY | Facility: HOSPITAL | Age: 53
Discharge: HOME | End: 2023-12-29
Attending: INTERNAL MEDICINE | Admitting: INTERNAL MEDICINE
Payer: COMMERCIAL

## 2023-12-29 DIAGNOSIS — M81.0 OSTEOPOROSIS, UNSPECIFIED OSTEOPOROSIS TYPE, UNSPECIFIED PATHOLOGICAL FRACTURE PRESENCE: ICD-10-CM

## 2023-12-29 PROCEDURE — 77080 DXA BONE DENSITY AXIAL: CPT

## 2024-01-08 ENCOUNTER — PATIENT OUTREACH (OUTPATIENT)
Dept: CARE COORDINATION | Facility: CLINIC | Age: 54
End: 2024-01-08
Payer: COMMERCIAL

## 2024-03-19 ENCOUNTER — OFFICE VISIT (OUTPATIENT)
Dept: FAMILY MEDICINE | Facility: CLINIC | Age: 54
End: 2024-03-19
Payer: COMMERCIAL

## 2024-03-19 VITALS
DIASTOLIC BLOOD PRESSURE: 63 MMHG | WEIGHT: 142.2 LBS | TEMPERATURE: 98.4 F | SYSTOLIC BLOOD PRESSURE: 100 MMHG | BODY MASS INDEX: 24.28 KG/M2 | OXYGEN SATURATION: 100 % | HEIGHT: 64 IN | RESPIRATION RATE: 16 BRPM | HEART RATE: 56 BPM

## 2024-03-19 DIAGNOSIS — M25.442 FINGER JOINT SWELLING, LEFT: Primary | ICD-10-CM

## 2024-03-19 PROCEDURE — 99213 OFFICE O/P EST LOW 20 MIN: CPT | Performed by: FAMILY MEDICINE

## 2024-03-19 ASSESSMENT — PATIENT HEALTH QUESTIONNAIRE - PHQ9
SUM OF ALL RESPONSES TO PHQ QUESTIONS 1-9: 2
SUM OF ALL RESPONSES TO PHQ QUESTIONS 1-9: 2
10. IF YOU CHECKED OFF ANY PROBLEMS, HOW DIFFICULT HAVE THESE PROBLEMS MADE IT FOR YOU TO DO YOUR WORK, TAKE CARE OF THINGS AT HOME, OR GET ALONG WITH OTHER PEOPLE: NOT DIFFICULT AT ALL

## 2024-03-19 ASSESSMENT — ANXIETY QUESTIONNAIRES
6. BECOMING EASILY ANNOYED OR IRRITABLE: NOT AT ALL
5. BEING SO RESTLESS THAT IT IS HARD TO SIT STILL: NOT AT ALL
1. FEELING NERVOUS, ANXIOUS, OR ON EDGE: NOT AT ALL
3. WORRYING TOO MUCH ABOUT DIFFERENT THINGS: NOT AT ALL
7. FEELING AFRAID AS IF SOMETHING AWFUL MIGHT HAPPEN: NOT AT ALL
8. IF YOU CHECKED OFF ANY PROBLEMS, HOW DIFFICULT HAVE THESE MADE IT FOR YOU TO DO YOUR WORK, TAKE CARE OF THINGS AT HOME, OR GET ALONG WITH OTHER PEOPLE?: NOT DIFFICULT AT ALL
GAD7 TOTAL SCORE: 0
GAD7 TOTAL SCORE: 0
7. FEELING AFRAID AS IF SOMETHING AWFUL MIGHT HAPPEN: NOT AT ALL
GAD7 TOTAL SCORE: 0
4. TROUBLE RELAXING: NOT AT ALL
IF YOU CHECKED OFF ANY PROBLEMS ON THIS QUESTIONNAIRE, HOW DIFFICULT HAVE THESE PROBLEMS MADE IT FOR YOU TO DO YOUR WORK, TAKE CARE OF THINGS AT HOME, OR GET ALONG WITH OTHER PEOPLE: NOT DIFFICULT AT ALL
2. NOT BEING ABLE TO STOP OR CONTROL WORRYING: NOT AT ALL

## 2024-03-19 NOTE — ASSESSMENT & PLAN NOTE
Left ring finger joint swelling-irritating to her because she can no longer wear her wedding ring.  She thinks she injured it about February 1 but does not recall any inciting trauma.      Routine is that she takes her ring off every night, puts lotion on her hands and then puts her ring back on in the morning.  That morning her ring went on just fine but then at night she had trouble taking it off and noticed that her left PIP joint looked bruised it was also tender and achy.    Since February 1 she has not been able to put her ring back on.  She is continued having achy and tenderness in the left ring PIP joint and no obvious swelling but she knows it swollen because she cannot get her ring on.  She says at rest there is no pain at all but when she is typing there is a 1 out of 10 pain.  Also if she presses on it there is a 2-3 out of 10 pain.    Her main concern is that she cannot put her ring on.    This is her left hand and she is right-handed.    She has a rheumatologist named Dr. Sangeetha Lala.  Who she sees for her antiphospholipid disorder and she says her dad has rheumatoid arthritis.    Recommend cool packing 15 minutes off/15 minutes on as often as she is able -compression with medical tape or small Ace bandage, and ibuprofen 600 mg p.o. 3 times daily with food as long as there is no stomach upset or other side effects for 10 days.  I am hopeful the above will reduce the swelling so that she can wear her ring again but if not I have recommended that she could get the opinion of her rheumatologist.

## 2024-03-19 NOTE — PROGRESS NOTES
Assessment & Plan   Problem List Items Addressed This Visit          Musculoskeletal and Integumentary    Finger joint swelling, left - Primary     Left ring finger joint swelling-irritating to her because she can no longer wear her wedding ring.  She thinks she injured it about February 1 but does not recall any inciting trauma.      Routine is that she takes her ring off every night, puts lotion on her hands and then puts her ring back on in the morning.  That morning her ring went on just fine but then at night she had trouble taking it off and noticed that her left PIP joint looked bruised it was also tender and achy.    Since February 1 she has not been able to put her ring back on.  She is continued having achy and tenderness in the left ring PIP joint and no obvious swelling but she knows it swollen because she cannot get her ring on.  She says at rest there is no pain at all but when she is typing there is a 1 out of 10 pain.  Also if she presses on it there is a 2-3 out of 10 pain.    Her main concern is that she cannot put her ring on.    This is her left hand and she is right-handed.    She has a rheumatologist named Dr. Sangeetha Lala.  Who she sees for her antiphospholipid disorder and she says her dad has rheumatoid arthritis.    Recommend cool packing 15 minutes off/15 minutes on as often as she is able -compression with medical tape or small Ace bandage, and ibuprofen 600 mg p.o. 3 times daily with food as long as there is no stomach upset or other side effects for 10 days.  I am hopeful the above will reduce the swelling so that she can wear her ring again but if not I have recommended that she could get the opinion of her rheumatologist.               Martina Nieto is a 54 year old, who is new to me today, presenting for the following health issues:  SWOLLEN FINGER (X1 month)        3/19/2024    12:28 PM   Additional Questions   Roomed by maureen   Accompanied by SELF     Via the Health  "Maintenance questionnaire, the patient has reported the following services have been completed -Eye Exam, this information has been sent to the abstraction team.  History of Present Illness       Reason for visit:  Pain finger  Symptom onset:  More than a month  Symptoms include:  Pain  Symptom intensity:  Mild  Symptom progression:  Staying the same  Had these symptoms before:  No    She eats 0-1 servings of fruits and vegetables daily.She consumes 1 sweetened beverage(s) daily.She exercises with enough effort to increase her heart rate 9 or less minutes per day.  She exercises with enough effort to increase her heart rate 3 or less days per week.   She is taking medications regularly.             Objective    /63 (BP Location: Left arm, Patient Position: Sitting, Cuff Size: Adult Regular)   Pulse 56   Temp 98.4  F (36.9  C) (Oral)   Resp 16   Ht 1.626 m (5' 4\")   Wt 64.5 kg (142 lb 3.2 oz)   LMP 12/06/2021   SpO2 100%   BMI 24.41 kg/m    Body mass index is 24.41 kg/m .  Physical Exam  Constitutional:       Appearance: Normal appearance.   HENT:      Head: Normocephalic and atraumatic.   Cardiovascular:      Rate and Rhythm: Normal rate and regular rhythm.   Pulmonary:      Effort: Pulmonary effort is normal.   Musculoskeletal:         General: Normal range of motion.      Right hand: No swelling, deformity, lacerations, tenderness or bony tenderness. Normal range of motion. Normal strength. Normal sensation. There is no disruption of two-point discrimination. Normal capillary refill. Normal pulse.      Left hand: Tenderness (tender over the left 4th pip joint.) present. No swelling, deformity, lacerations or bony tenderness. Normal range of motion. Normal strength. Normal sensation. There is no disruption of two-point discrimination. Normal capillary refill. Normal pulse.      Cervical back: Normal range of motion and neck supple.   Neurological:      General: No focal deficit present.      Mental " Status: She is alert and oriented to person, place, and time.              Signed Electronically by: Viola Babb MD

## 2024-04-04 ENCOUNTER — TRANSFERRED RECORDS (OUTPATIENT)
Dept: HEALTH INFORMATION MANAGEMENT | Facility: CLINIC | Age: 54
End: 2024-04-04
Payer: COMMERCIAL

## 2024-04-05 ENCOUNTER — TRANSFERRED RECORDS (OUTPATIENT)
Dept: HEALTH INFORMATION MANAGEMENT | Facility: CLINIC | Age: 54
End: 2024-04-05
Payer: COMMERCIAL

## 2024-04-19 DIAGNOSIS — F41.1 GENERALIZED ANXIETY DISORDER: ICD-10-CM

## 2024-04-22 RX ORDER — ESCITALOPRAM OXALATE 5 MG/1
TABLET ORAL
Qty: 90 TABLET | Refills: 0 | Status: SHIPPED | OUTPATIENT
Start: 2024-04-22 | End: 2024-06-25

## 2024-06-21 DIAGNOSIS — F41.1 GENERALIZED ANXIETY DISORDER: ICD-10-CM

## 2024-06-25 RX ORDER — ESCITALOPRAM OXALATE 5 MG/1
TABLET ORAL
Qty: 90 TABLET | Refills: 0 | Status: SHIPPED | OUTPATIENT
Start: 2024-06-25 | End: 2024-08-26

## 2024-08-09 ENCOUNTER — TRANSFERRED RECORDS (OUTPATIENT)
Dept: HEALTH INFORMATION MANAGEMENT | Facility: CLINIC | Age: 54
End: 2024-08-09
Payer: COMMERCIAL

## 2024-08-23 DIAGNOSIS — F41.1 GENERALIZED ANXIETY DISORDER: ICD-10-CM

## 2024-08-26 RX ORDER — ESCITALOPRAM OXALATE 5 MG/1
TABLET ORAL
Qty: 90 TABLET | Refills: 0 | Status: SHIPPED | OUTPATIENT
Start: 2024-08-26

## 2024-09-05 ENCOUNTER — PATIENT OUTREACH (OUTPATIENT)
Dept: CARE COORDINATION | Facility: CLINIC | Age: 54
End: 2024-09-05
Payer: COMMERCIAL

## 2024-09-12 ENCOUNTER — TRANSFERRED RECORDS (OUTPATIENT)
Dept: HEALTH INFORMATION MANAGEMENT | Facility: CLINIC | Age: 54
End: 2024-09-12
Payer: COMMERCIAL

## 2024-09-19 ENCOUNTER — PATIENT OUTREACH (OUTPATIENT)
Dept: CARE COORDINATION | Facility: CLINIC | Age: 54
End: 2024-09-19
Payer: COMMERCIAL

## 2024-10-03 ENCOUNTER — PATIENT OUTREACH (OUTPATIENT)
Dept: GASTROENTEROLOGY | Facility: CLINIC | Age: 54
End: 2024-10-03
Payer: COMMERCIAL

## 2024-10-03 DIAGNOSIS — Z12.11 SPECIAL SCREENING FOR MALIGNANT NEOPLASMS, COLON: Primary | ICD-10-CM

## 2024-10-03 NOTE — PROGRESS NOTES
"Hx adenomatous polyps with 5yr recall recommended on last colonoscopy performed in 2018    [facility] Screening Colonoscopy Referral Review    Patient meets the inclusion criteria for screening colonoscopy standing order.    Ordering/Referring Provider:  Alicia Ghosh     BMI: Estimated body mass index is 24.41 kg/m  as calculated from the following:    Height as of 3/19/24: 1.626 m (5' 4\").    Weight as of 3/19/24: 64.5 kg (142 lb 3.2 oz).     Sedation:  Does patient have any of the following conditions affecting sedation?  No medical conditions affecting sedation.    Previous Scopes:  Any previous recommendations or follow up needs based on previous scope?  na / No recommendations.    Medical Concerns to Postpone Order:  Does patient have any of the following medical concerns that should postpone/delay colonoscopy referral?  No medical conditions affecting colonoscopy referral.    Final Referral Details:  Based on patient's medical history patient is appropriate for referral order with moderate sedation. If patient's BMI > 50 do not schedule in [facility].  "

## 2024-10-25 DIAGNOSIS — F41.1 GENERALIZED ANXIETY DISORDER: ICD-10-CM

## 2024-10-28 RX ORDER — ESCITALOPRAM OXALATE 5 MG/1
TABLET ORAL
Qty: 90 TABLET | Refills: 3 | Status: SHIPPED | OUTPATIENT
Start: 2024-10-28

## 2024-11-11 ENCOUNTER — LAB REQUISITION (OUTPATIENT)
Dept: LAB | Facility: CLINIC | Age: 54
End: 2024-11-11

## 2024-11-11 DIAGNOSIS — Z12.4 ENCOUNTER FOR SCREENING FOR MALIGNANT NEOPLASM OF CERVIX: ICD-10-CM

## 2024-11-11 PROCEDURE — 87624 HPV HI-RISK TYP POOLED RSLT: CPT | Performed by: OBSTETRICS & GYNECOLOGY

## 2024-11-11 PROCEDURE — G0123 SCREEN CERV/VAG THIN LAYER: HCPCS | Performed by: OBSTETRICS & GYNECOLOGY

## 2024-11-12 LAB
HPV HR 12 DNA CVX QL NAA+PROBE: NEGATIVE
HPV16 DNA CVX QL NAA+PROBE: NEGATIVE
HPV18 DNA CVX QL NAA+PROBE: NEGATIVE
HUMAN PAPILLOMA VIRUS FINAL DIAGNOSIS: NORMAL

## 2024-11-13 ENCOUNTER — OFFICE VISIT (OUTPATIENT)
Dept: FAMILY MEDICINE | Facility: CLINIC | Age: 54
End: 2024-11-13
Payer: COMMERCIAL

## 2024-11-13 VITALS
HEIGHT: 64 IN | OXYGEN SATURATION: 96 % | BODY MASS INDEX: 23.9 KG/M2 | TEMPERATURE: 97.9 F | RESPIRATION RATE: 16 BRPM | HEART RATE: 59 BPM | DIASTOLIC BLOOD PRESSURE: 60 MMHG | WEIGHT: 140 LBS | SYSTOLIC BLOOD PRESSURE: 94 MMHG

## 2024-11-13 DIAGNOSIS — M35.00 SJOGREN'S SYNDROME, WITH UNSPECIFIED ORGAN INVOLVEMENT (H): ICD-10-CM

## 2024-11-13 DIAGNOSIS — D68.59 PRIMARY HYPERCOAGULABLE STATE (H): ICD-10-CM

## 2024-11-13 DIAGNOSIS — Z00.00 ENCOUNTER FOR PREVENTATIVE ADULT HEALTH CARE EXAMINATION: Primary | ICD-10-CM

## 2024-11-13 DIAGNOSIS — M35.1 MIXED CONNECTIVE TISSUE DISEASE (H): ICD-10-CM

## 2024-11-13 DIAGNOSIS — Z12.31 VISIT FOR SCREENING MAMMOGRAM: ICD-10-CM

## 2024-11-13 DIAGNOSIS — R22.31 MASS OF FINGER OF RIGHT HAND: ICD-10-CM

## 2024-11-13 DIAGNOSIS — R91.8 PULMONARY NODULES: ICD-10-CM

## 2024-11-13 DIAGNOSIS — E03.9 HYPOTHYROIDISM, UNSPECIFIED TYPE: ICD-10-CM

## 2024-11-13 DIAGNOSIS — M81.8 OTHER OSTEOPOROSIS WITHOUT CURRENT PATHOLOGICAL FRACTURE: ICD-10-CM

## 2024-11-13 DIAGNOSIS — R11.0 NAUSEA: ICD-10-CM

## 2024-11-13 DIAGNOSIS — C73 THYROID CANCER (H): ICD-10-CM

## 2024-11-13 LAB
ALBUMIN SERPL BCG-MCNC: 4.6 G/DL (ref 3.5–5.2)
ALP SERPL-CCNC: 59 U/L (ref 40–150)
ALT SERPL W P-5'-P-CCNC: 18 U/L (ref 0–50)
ANION GAP SERPL CALCULATED.3IONS-SCNC: 10 MMOL/L (ref 7–15)
AST SERPL W P-5'-P-CCNC: 26 U/L (ref 0–45)
BILIRUB SERPL-MCNC: 0.3 MG/DL
BUN SERPL-MCNC: 15.7 MG/DL (ref 6–20)
CALCIUM SERPL-MCNC: 9.1 MG/DL (ref 8.8–10.4)
CHLORIDE SERPL-SCNC: 101 MMOL/L (ref 98–107)
CHOLEST SERPL-MCNC: 176 MG/DL
CREAT SERPL-MCNC: 0.91 MG/DL (ref 0.51–0.95)
EGFRCR SERPLBLD CKD-EPI 2021: 75 ML/MIN/1.73M2
FASTING STATUS PATIENT QL REPORTED: YES
FASTING STATUS PATIENT QL REPORTED: YES
GLUCOSE SERPL-MCNC: 87 MG/DL (ref 70–99)
HCO3 SERPL-SCNC: 27 MMOL/L (ref 22–29)
HDLC SERPL-MCNC: 67 MG/DL
IRON BINDING CAPACITY (ROCHE): 339 UG/DL (ref 240–430)
IRON SATN MFR SERPL: 24 % (ref 15–46)
IRON SERPL-MCNC: 83 UG/DL (ref 37–145)
LDLC SERPL CALC-MCNC: 93 MG/DL
NONHDLC SERPL-MCNC: 109 MG/DL
POTASSIUM SERPL-SCNC: 4.6 MMOL/L (ref 3.4–5.3)
PROT SERPL-MCNC: 7.4 G/DL (ref 6.4–8.3)
SODIUM SERPL-SCNC: 138 MMOL/L (ref 135–145)
TRIGL SERPL-MCNC: 79 MG/DL
VIT B12 SERPL-MCNC: 1094 PG/ML (ref 232–1245)
VIT D+METAB SERPL-MCNC: 48 NG/ML (ref 20–50)

## 2024-11-13 PROCEDURE — 82306 VITAMIN D 25 HYDROXY: CPT | Performed by: FAMILY MEDICINE

## 2024-11-13 PROCEDURE — 80061 LIPID PANEL: CPT | Performed by: FAMILY MEDICINE

## 2024-11-13 PROCEDURE — 99396 PREV VISIT EST AGE 40-64: CPT | Mod: 25 | Performed by: FAMILY MEDICINE

## 2024-11-13 PROCEDURE — 82607 VITAMIN B-12: CPT | Performed by: FAMILY MEDICINE

## 2024-11-13 PROCEDURE — 90471 IMMUNIZATION ADMIN: CPT | Performed by: FAMILY MEDICINE

## 2024-11-13 PROCEDURE — 83550 IRON BINDING TEST: CPT | Performed by: FAMILY MEDICINE

## 2024-11-13 PROCEDURE — 83540 ASSAY OF IRON: CPT | Performed by: FAMILY MEDICINE

## 2024-11-13 PROCEDURE — 99214 OFFICE O/P EST MOD 30 MIN: CPT | Mod: 25 | Performed by: FAMILY MEDICINE

## 2024-11-13 PROCEDURE — 90673 RIV3 VACCINE NO PRESERV IM: CPT | Performed by: FAMILY MEDICINE

## 2024-11-13 PROCEDURE — 36415 COLL VENOUS BLD VENIPUNCTURE: CPT | Performed by: FAMILY MEDICINE

## 2024-11-13 PROCEDURE — 80053 COMPREHEN METABOLIC PANEL: CPT | Performed by: FAMILY MEDICINE

## 2024-11-13 RX ORDER — ONDANSETRON 4 MG/1
4 TABLET, ORALLY DISINTEGRATING ORAL EVERY 8 HOURS PRN
Qty: 30 TABLET | Refills: 3 | Status: SHIPPED | OUTPATIENT
Start: 2024-11-13

## 2024-11-13 RX ORDER — LEVOTHYROXINE SODIUM 50 UG/1
50 TABLET ORAL
COMMUNITY
Start: 2024-10-14

## 2024-11-13 SDOH — HEALTH STABILITY: PHYSICAL HEALTH: ON AVERAGE, HOW MANY DAYS PER WEEK DO YOU ENGAGE IN MODERATE TO STRENUOUS EXERCISE (LIKE A BRISK WALK)?: 5 DAYS

## 2024-11-13 ASSESSMENT — SOCIAL DETERMINANTS OF HEALTH (SDOH): HOW OFTEN DO YOU GET TOGETHER WITH FRIENDS OR RELATIVES?: THREE TIMES A WEEK

## 2024-11-13 NOTE — PROGRESS NOTES
Preventive Care Visit  [facility]  Alicia Ghosh MD, Family Medicine  Nov 13, 2024      Assessment & Plan       ICD-10-CM    1. Encounter for preventative adult health care examination  Z00.00 Lipid panel reflex to direct LDL Fasting     Lipid panel reflex to direct LDL Fasting      2. Hypothyroidism  E03.9       3. Pulmonary nodules  R91.8 CT Chest/Abdomen/Pelvis w Contrast      4. Mixed connective tissue disease (H)  M35.1       5. Other osteoporosis without current pathological fracture  M81.8 Vitamin D Deficiency     Vitamin D Deficiency      6. Sjogren's syndrome, with unspecified organ involvement (H)  M35.00       7. Primary hypercoagulable state (H)  D68.59       8. Thyroid cancer (H)  C73       9. Nausea  R11.0 ondansetron (ZOFRAN ODT) 4 MG ODT tab     Comprehensive metabolic panel (BMP + Alb, Alk Phos, ALT, AST, Total. Bili, TP)     Vitamin B12     Iron and iron binding capacity     CT Chest/Abdomen/Pelvis w Contrast     Comprehensive metabolic panel (BMP + Alb, Alk Phos, ALT, AST, Total. Bili, TP)     Vitamin B12     Iron and iron binding capacity      10. Visit for screening mammogram  Z12.31 MA Screen Bilateral w/Alex      11. Mass of finger of right hand  R22.31 Orthopedic  Referral        Nausea can be a soft marker for ovarian or pancreatic cancer.  I am ordering a CT of your abdomen and pelvis.  Please call [phone] and set that up for 2 weeks from now.  That gives insurance time to look for or to go through the prior authorization process.    Also with having pulmonary nodules on the CT August 2022 and being immune suppressed ongoing order chest CT with the above CTs.    Since you have had a history of a mild contrast allergy and going to have you take 50 mg of Benadryl 1 hour before your CTs, you may need a  as you will be tired.    If all of the imaging is normal then you could try Pepcid 20 mg up to 2 times a day as needed for nausea and I did  prescribe Zofran 4 mg up to 4 times a day as needed for nausea.    Certainly if the nausea is getting worse we would want you to see GI and consider an endoscopy.    Bone density scan will be due December 2025.  That would be ordered by your endocrinologist.    Colonoscopy due and will set herself.    If you feel you doing stable mood wise and would like to go off the Escitalopram 5 mg my recommendation would be in the spring.  You could go to every other day for couple weeks then off.  Then give yourself a month to see if you needed to restart that or not.    Glad you are seeing endocrinology with history of thyroid cancer, hypothyroidism and osteoporosis.    Glad you are seeing rheumatology with a mixed connective tissue disorder.    Patient has been advised of split billing requirements and indicates understanding: Yes        Counseling  Appropriate preventive services were addressed with this patient via screening, questionnaire, or discussion as appropriate for fall prevention, nutrition, physical activity, Tobacco-use cessation, social engagement, weight loss and cognition.  Checklist reviewing preventive services available has been given to the patient.  Reviewed patient's diet, addressing concerns and/or questions.   The patient was instructed to see the dentist every 6 months.           Martina Nieto is a 54 year old, presenting for the following:  Physical        11/13/2024    10:17 AM   Additional Questions   Roomed by Amelie HORNE CMA          HPI            Nausea:  Sometimes after eating will get nausea for about 4 months.  About 1-2 times a week.  Level of nausea is annoying except when it was severe when she threw up 4 days ago.  Nausea is a very new symptom for her she has never had chronic nausea.  She vomited 2 times, 4 days ago.  Had eaten home made chili.  She never vomits, so this is very rare for her, only vomited a few times in her life.  No diarrhea.  No fever.  Can have some generalized  abdominal pain with this.  Spouse had same food and did ok.  No known specific food triggers.  No GERD symptoms or history.  Ulcer in early 20's.  No Ibuprofen type meds.  Rare alcohol, 1-2 a week, no tobacco.  Saw hematology and was having bruising was told to go off her aspirin and fish oil.  Was on ASA for antiphospholipid syndrome.   Went off ASA about amonth ago, no change to the nausea.  Not stress related.    Mood: What is doing very well and she is on Lexapro 5 mg.  Wondering if she can wean off at some point.    Pulmonary nodules: She had 2 pulmonary nodules on CT August 2022.  Patient is high risk being on immune suppression so this should be followed with another chest CT.    Osteoporosis: On alendronate.    Rheum: Patient has Sjogren's and a mixed connective tissue disorder.  She follows with rheumatology.    Lump on right third finger: She was playing gin and when knocked on table and it instantly hurt and swelled up.  Right 3rd PIP has a lump that is painful.    Health Maintenance   Topic Date Due    HIV SCREENING  NA    HEPATITIS C SCREENING  NA    HEPATITIS B IMMUNIZATION (1 of 3 - 19+ 3-dose series) Declined    ZOSTER IMMUNIZATION (1 of 2) If you are interested in the new shingles shot, Shingrix, please check with insurance for coverage either in clinic or at a pharmacy. It is a 2 shot series 2-6 months apart.     TSH W/FREE T4 REFLEX  Per endocrine    COLORECTAL CANCER SCREENING  Will set up    INFLUENZA VACCINE (1) Today    COVID-19 Vaccine (7 - 2024-25 season) Later    LUNG CANCER SCREENING  Today, quit 30 years    ANNUAL REVIEW OF HM ORDERS  Today    MAMMO SCREENING  01/10/2025, ordered    EYE EXAM  04/04/2025    YEARLY PREVENTIVE VISIT  Today    GLUCOSE  Today    LIPID  Today    HPV TEST  11/11/2029    PAP  11/11/2029    ADVANCE CARE PLANNING  Declined    DTAP/TDAP/TD IMMUNIZATION (3 - Td or Tdap) 08/21/2030    RSV VACCINE (1 - 1-dose 75+ series) Age 60-74    PHQ-2 (once per calendar year)   Completed    Pneumococcal Vaccine: Pediatrics (0 to 5 Years) and At-Risk Patients (6 to 64 Years)  Age 65, sooner if agrees to    HPV IMMUNIZATION  Aged Out    MENINGITIS IMMUNIZATION  Aged Out    RSV MONOCLONAL ANTIBODY  Aged Out         Health Care Directive  Patient does not have a Health Care Directive: Discussed advance care planning with patient; however, patient declined at this time.      11/13/2024   General Health   How would you rate your overall physical health? Good   Feel stress (tense, anxious, or unable to sleep) Only a little      (!) STRESS CONCERN      11/13/2024   Nutrition   Three or more servings of calcium each day? Yes   Diet: Regular (no restrictions)   How many servings of fruit and vegetables per day? (!) 0-1   How many sweetened beverages each day? 0-1            11/13/2024   Exercise   Days per week of moderate/strenous exercise 5 days            11/13/2024   Social Factors   Frequency of gathering with friends or relatives Three times a week   Worry food won't last until get money to buy more No   Food not last or not have enough money for food? No   Do you have housing? (Housing is defined as stable permanent housing and does not include staying ouside in a car, in a tent, in an abandoned building, in an overnight shelter, or couch-surfing.) Yes   Are you worried about losing your housing? No   Lack of transportation? No   Unable to get utilities (heat,electricity)? No            11/13/2024   Fall Risk   Fallen 2 or more times in the past year? No    Trouble with walking or balance? No        Patient-reported          11/13/2024   Dental   Dentist two times every year? (!) NO            11/13/2024   TB Screening   Were you born outside of the [place]? No              Today's PHQ-2 Score:       3/19/2024    12:23 PM   PHQ-2 ( 1999 [employer])   Q1: Little interest or pleasure in doing things 0    Q2: Feeling down, depressed or hopeless 0    PHQ-2 Score 0   Q1: Little interest or pleasure in  doing things Not at all   Q2: Feeling down, depressed or hopeless Not at all   PHQ-2 Score 0       Patient-reported         11/13/2024   Substance Use   Alcohol more than 3/day or more than 7/wk No   Do you use any other substances recreationally? No        Social History     Tobacco Use    Smoking status: Former     Types: Cigarettes     Passive exposure: Never    Smokeless tobacco: Never           12/27/2022   LAST FHS-7 RESULTS   1st degree relative breast or ovarian cancer No   Any relative bilateral breast cancer No   Any male have breast cancer No   Any ONE woman have BOTH breast AND ovarian cancer No   Any woman with breast cancer before 50yrs No   2 or more relatives with breast AND/OR ovarian cancer No   2 or more relatives with breast AND/OR bowel cancer No           Mammogram Screening - Mammogram every 1-2 years updated in Health Maintenance based on mutual decision making          11/13/2024   One time HIV Screening   Previous HIV test? I don't know          11/13/2024   STI Screening   New sexual partner(s) since last STI/HIV test? No        History of abnormal Pap smear: see below        Latest Ref Rng & Units 11/11/2024     9:00 AM 12/21/2022     4:30 PM   PAP / HPV   PAP   Negative for Intraepithelial Lesion or Malignancy (NILM)    HPV 16 DNA Negative Negative  Negative    HPV 18 DNA Negative Negative  Negative    Other HR HPV Negative Negative  Negative      ASCVD Risk   The 10-year ASCVD risk score (Martin RODRIGUEZ, et al., 2019) is: 0.7%    Values used to calculate the score:      Age: 54 years      Sex: Female      Is Non- : No      Diabetic: No      Tobacco smoker: No      Systolic Blood Pressure: 94 mmHg      Is BP treated: No      HDL Cholesterol: 72 mg/dL      Total Cholesterol: 184 mg/dL           Reviewed and updated as needed this visit by Provider                             Objective    Exam  BP 94/60 (BP Location: Left arm, Patient Position: Sitting, Cuff  "Size: Adult Regular)   Pulse 59   Temp 97.9  F (36.6  C) (Oral)   Resp 16   Ht 1.626 m (5' 4\")   Wt 63.5 kg (140 lb)   LMP 12/06/2021   SpO2 96%   BMI 24.03 kg/m     Estimated body mass index is 24.03 kg/m  as calculated from the following:    Height as of this encounter: 1.626 m (5' 4\").    Weight as of this encounter: 63.5 kg (140 lb).    Physical Exam  GENERAL: alert and no distress  EYES: Eyes grossly normal to inspection, PERRL and conjunctivae and sclerae normal  HENT: ear canals and TM's normal, nose and mouth without ulcers or lesions  NECK: no adenopathy, no asymmetry, masses, or scars  RESP: lungs clear to auscultation - no rales, rhonchi or wheezes  BREAST: normal without masses, tenderness or nipple discharge and no palpable axillary masses or adenopathy  CV: regular rate and rhythm, normal S1 S2, no S3 or S4, no murmur, click or rub, no peripheral edema  ABDOMEN: soft, nontender, no hepatosplenomegaly, no masses and bowel sounds normal, except a firm mobile lump on the dorsum of the right third PIP  MS: no gross musculoskeletal defects noted, no edema  SKIN: no suspicious lesions or rashes  NEURO: Normal strength and tone, mentation intact and speech normal  PSYCH: mentation appears normal, affect normal/bright    Prior to immunization administration, verified patients identity using patient s name and date of birth. Please see Immunization Activity for additional information.     Screening Questionnaire for Adult Immunization    Are you sick today?   No   Do you have allergies to medications, food, a vaccine component or latex?   No   Have you ever had a serious reaction after receiving a vaccination?   No   Do you have a long-term health problem with heart, lung, kidney, or metabolic disease (e.g., diabetes), asthma, a blood disorder, no spleen, complement component deficiency, a cochlear implant, or a spinal fluid leak?  Are you on long-term aspirin therapy?   No   Do you have cancer, " leukemia, HIV/AIDS, or any other immune system problem?   No   Do you have a parent, brother, or sister with an immune system problem?   No   In the past 3 months, have you taken medications that affect  your immune system, such as prednisone, other steroids, or anticancer drugs; drugs for the treatment of rheumatoid arthritis, Crohn s disease, or psoriasis; or have you had radiation treatments?   No   Have you had a seizure, or a brain or other nervous system problem?   No   During the past year, have you received a transfusion of blood or blood    products, or been given immune (gamma) globulin or antiviral drug?   No   For women: Are you pregnant or is there a chance you could become       pregnant during the next month?   No   Have you received any vaccinations in the past 4 weeks?   No     Immunization questionnaire answers were all negative.      Patient instructed to remain in clinic for 15 minutes afterwards, and to report any adverse reactions.     Screening performed by Izabella Ferrell MA on 11/13/2024 at 11:19 AM.         Signed Electronically by: Alicia Ghosh MD

## 2024-11-13 NOTE — PATIENT INSTRUCTIONS
Nausea can be a soft marker for ovarian or pancreatic cancer.  I am ordering a CT of your abdomen and pelvis.  Please call [phone] and set that up for 2 weeks from now.  That gives insurance time to look for or to go through the prior authorization process.    Also with having pulmonary nodules on the CT August 2022 and being immune suppressed ongoing order chest CT with the above CTs.    Since you have had a history of a mild contrast allergy and going to have you take 50 mg of Benadryl 1 hour before your CTs, you may need a  as you will be tired.    If all of the imaging is normal then you could try Pepcid 20 mg up to 2 times a day as needed for nausea and I did prescribe Zofran 4 mg up to 4 times a day as needed for nausea.    Certainly if the nausea is getting worse we would want you to see GI and consider an endoscopy.    Bone density scan will be due December 2025.  That would be ordered by your endocrinologist.    Colonoscopy due and will set herself.    If you feel you doing stable mood wise and would like to go off the Escitalopram 5 mg my recommendation would be in the spring.  You could go to every other day for couple weeks then off.  Then give yourself a month to see if you needed to restart that or not.    Glad you are seeing endocrinology with history of thyroid cancer, hypothyroidism and osteoporosis.    Glad you are seeing rheumatology with a mixed connective tissue disorder.        Health Maintenance   Topic Date Due    HIV SCREENING  NA    HEPATITIS C SCREENING  NA    HEPATITIS B IMMUNIZATION (1 of 3 - 19+ 3-dose series) Declined    ZOSTER IMMUNIZATION (1 of 2) If you are interested in the new shingles shot, Shingrix, please check with insurance for coverage either in clinic or at a pharmacy. It is a 2 shot series 2-6 months apart.     TSH W/FREE T4 REFLEX  Per endocrine    COLORECTAL CANCER SCREENING  Will set up    INFLUENZA VACCINE (1) Today    COVID-19 Vaccine (7 - 2024-25 season)  Later    LUNG CANCER SCREENING  Today, quit 30 years    ANNUAL REVIEW OF HM ORDERS  Today    MAMMO SCREENING  01/10/2025, ordered    EYE EXAM  04/04/2025    YEARLY PREVENTIVE VISIT  Today    GLUCOSE  Today    LIPID  Today    HPV TEST  11/11/2029    PAP  11/11/2029    ADVANCE CARE PLANNING  Declined    DTAP/TDAP/TD IMMUNIZATION (3 - Td or Tdap) 08/21/2030    RSV VACCINE (1 - 1-dose 75+ series) Age 60-74    PHQ-2 (once per calendar year)  Completed    Pneumococcal Vaccine: Pediatrics (0 to 5 Years) and At-Risk Patients (6 to 64 Years)  Age 65, sooner if agrees to    HPV IMMUNIZATION  Aged Out    MENINGITIS IMMUNIZATION  Aged Out    RSV MONOCLONAL ANTIBODY  Aged Out

## 2024-11-14 ENCOUNTER — PATIENT OUTREACH (OUTPATIENT)
Dept: CARE COORDINATION | Facility: CLINIC | Age: 54
End: 2024-11-14
Payer: COMMERCIAL

## 2024-11-15 LAB
BKR AP ASSOCIATED HPV REPORT: NORMAL
BKR LAB AP GYN ADEQUACY: NORMAL
BKR LAB AP GYN INTERPRETATION: NORMAL
BKR LAB AP LMP: NORMAL
BKR LAB AP PREVIOUS ABNL DX: NORMAL
BKR LAB AP PREVIOUS ABNORMAL: NORMAL
PATH REPORT.COMMENTS IMP SPEC: NORMAL
PATH REPORT.COMMENTS IMP SPEC: NORMAL
PATH REPORT.RELEVANT HX SPEC: NORMAL

## 2024-11-18 ENCOUNTER — PATIENT OUTREACH (OUTPATIENT)
Dept: CARE COORDINATION | Facility: CLINIC | Age: 54
End: 2024-11-18
Payer: COMMERCIAL

## 2024-11-18 ENCOUNTER — TELEPHONE (OUTPATIENT)
Dept: GASTROENTEROLOGY | Facility: CLINIC | Age: 54
End: 2024-11-18
Payer: COMMERCIAL

## 2024-11-18 NOTE — TELEPHONE ENCOUNTER
"Endoscopy Scheduling Screen    Have you had any respiratory illness or flu-like symptoms in the last 10 days?  No    What is your communication preference for Instructions and/or Bowel Prep?   MyChart    What insurance is in the chart?  Other:  HP's    Ordering/Referring Provider: Alicia Ghosh MD in [facility]   (If ordering provider performs procedure, schedule with ordering provider unless otherwise instructed. )    BMI: Estimated body mass index is 24.03 kg/m  as calculated from the following:    Height as of 11/13/24: 1.626 m (5' 4\").    Weight as of 11/13/24: 63.5 kg (140 lb).     Sedation Ordered  moderate sedation.   If patient BMI > 50 do not schedule in [facility].    If patient BMI > 45 do not schedule at [facility].    Are you taking methadone or Suboxone?  NO, No RN review required.    Have you been diagnosed and are being treated for severe PTSD or severe anxiety?  NO, No RN review required.    Are you taking any prescription medications for pain 3 or more times per week?   NO, No RN review required.    Do you have a history of malignant hyperthermia?  No    (Females) Are you currently pregnant?   No     Have you been diagnosed or told you have pulmonary hypertension?   No    Do you have an LVAD?  No    Have you been told you have moderate to severe sleep apnea?  No.    Have you been told you have COPD, asthma, or any other lung disease?  No    Do you have any heart conditions?  No     Have you ever had or are you waiting for an organ transplant?  No. Continue scheduling, no site restrictions.    Have you had a stroke or transient ischemic attack (TIA aka \"mini stroke\" in the last 6 months?   No    Have you been diagnosed with or been told you have cirrhosis of the liver?   No.    Are you currently on dialysis?   No    Do you need assistance transferring?   No    BMI: Estimated body mass index is 24.03 kg/m  as calculated from the following:    Height as of 11/13/24: 1.626 m (5' 4\").    Weight as " of 11/13/24: 63.5 kg (140 lb).     Is patients BMI > 40 and scheduling location [facility]?  No    Do you take an injectable or oral medication for weight loss or diabetes (excluding insulin)?  No    Do you take the medication Naltrexone?  No    Do you take blood thinners?  No       Prep   Are you currently on dialysis or do you have chronic kidney disease?  No    Do you have a diagnosis of diabetes?  No    Do you have a diagnosis of cystic fibrosis (CF)?  No    On a regular basis do you go 3 -5 days between bowel movements?  No    BMI > 40?  No    Preferred Pharmacy:    [id number] - [place], [place] - [address]  [address] [place] [address] E  [facility] [address]  Phone: [phone] Fax: [phone]    Final Scheduling Details     Procedure scheduled  Colonoscopy    Surgeon:  Linda     Date of procedure:  12.11.24     Pre-OP / PAC:   No - Not required for this site.    Location  [facility] - [facility] - Patient preference.    Sedation   Moderate Sedation - Per order.      Patient Reminders:   You will receive a call from a Nurse to review instructions and health history.  This assessment must be completed prior to your procedure.  Failure to complete the Nurse assessment may result in the procedure being cancelled.      On the day of your procedure, please designate an adult(s) who can drive you home stay with you for the next 24 hours. The medicines used in the exam will make you sleepy. You will not be able to drive.      You cannot take public transportation, ride share services, or non-medical taxi service without a responsible caregiver.  Medical transport services are allowed with the requirement that a responsible caregiver will receive you at your destination.  We require that drivers and caregivers are confirmed prior to your procedure.

## 2024-12-09 ENCOUNTER — TELEPHONE (OUTPATIENT)
Dept: GASTROENTEROLOGY | Facility: CLINIC | Age: 54
End: 2024-12-09
Payer: COMMERCIAL

## 2024-12-10 ENCOUNTER — TRANSFERRED RECORDS (OUTPATIENT)
Dept: HEALTH INFORMATION MANAGEMENT | Facility: CLINIC | Age: 54
End: 2024-12-10

## 2024-12-10 ENCOUNTER — ANCILLARY PROCEDURE (OUTPATIENT)
Dept: MAMMOGRAPHY | Facility: CLINIC | Age: 54
End: 2024-12-10
Attending: FAMILY MEDICINE
Payer: COMMERCIAL

## 2024-12-10 DIAGNOSIS — Z12.31 VISIT FOR SCREENING MAMMOGRAM: ICD-10-CM

## 2024-12-10 PROCEDURE — 77063 BREAST TOMOSYNTHESIS BI: CPT

## 2024-12-11 ENCOUNTER — PATIENT OUTREACH (OUTPATIENT)
Dept: CARE COORDINATION | Facility: CLINIC | Age: 54
End: 2024-12-11
Payer: COMMERCIAL

## 2024-12-11 ENCOUNTER — HOSPITAL ENCOUNTER (OUTPATIENT)
Facility: AMBULATORY SURGERY CENTER | Age: 54
Discharge: HOME | End: 2024-12-11
Attending: STUDENT IN AN ORGANIZED HEALTH CARE EDUCATION/TRAINING PROGRAM
Payer: COMMERCIAL

## 2024-12-11 VITALS
SYSTOLIC BLOOD PRESSURE: 105 MMHG | RESPIRATION RATE: 16 BRPM | WEIGHT: 138 LBS | TEMPERATURE: 98.2 F | BODY MASS INDEX: 23.69 KG/M2 | HEART RATE: 63 BPM | OXYGEN SATURATION: 98 % | DIASTOLIC BLOOD PRESSURE: 71 MMHG

## 2024-12-11 DIAGNOSIS — Z12.11 COLON CANCER SCREENING: Primary | ICD-10-CM

## 2024-12-11 LAB — COLONOSCOPY: NORMAL

## 2024-12-11 PROCEDURE — 45378 DIAGNOSTIC COLONOSCOPY: CPT

## 2024-12-11 PROCEDURE — G8907 PT DOC NO EVENTS ON DISCHARG: HCPCS

## 2024-12-11 PROCEDURE — G8918 PT W/O PREOP ORDER IV AB PRO: HCPCS

## 2024-12-11 RX ORDER — FENTANYL CITRATE 50 UG/ML
INJECTION, SOLUTION INTRAMUSCULAR; INTRAVENOUS PRN
Status: DISCONTINUED | OUTPATIENT
Start: 2024-12-11 | End: 2024-12-11 | Stop reason: HOSPADM

## 2024-12-11 RX ORDER — NALOXONE HYDROCHLORIDE 0.4 MG/ML
0.4 INJECTION, SOLUTION INTRAMUSCULAR; INTRAVENOUS; SUBCUTANEOUS
Status: DISCONTINUED | OUTPATIENT
Start: 2024-12-11 | End: 2024-12-12 | Stop reason: HOSPADM

## 2024-12-11 RX ORDER — LIDOCAINE 40 MG/G
CREAM TOPICAL
Status: DISCONTINUED | OUTPATIENT
Start: 2024-12-11 | End: 2024-12-12 | Stop reason: HOSPADM

## 2024-12-11 RX ORDER — PROCHLORPERAZINE MALEATE 10 MG
10 TABLET ORAL EVERY 6 HOURS PRN
Status: DISCONTINUED | OUTPATIENT
Start: 2024-12-11 | End: 2024-12-12 | Stop reason: HOSPADM

## 2024-12-11 RX ORDER — NALOXONE HYDROCHLORIDE 0.4 MG/ML
0.2 INJECTION, SOLUTION INTRAMUSCULAR; INTRAVENOUS; SUBCUTANEOUS
Status: DISCONTINUED | OUTPATIENT
Start: 2024-12-11 | End: 2024-12-12 | Stop reason: HOSPADM

## 2024-12-11 RX ORDER — FLUMAZENIL 0.1 MG/ML
0.2 INJECTION, SOLUTION INTRAVENOUS
Status: ACTIVE | OUTPATIENT
Start: 2024-12-11 | End: 2024-12-11

## 2024-12-11 RX ORDER — ONDANSETRON 2 MG/ML
4 INJECTION INTRAMUSCULAR; INTRAVENOUS
Status: DISCONTINUED | OUTPATIENT
Start: 2024-12-11 | End: 2024-12-12 | Stop reason: HOSPADM

## 2024-12-11 RX ORDER — ONDANSETRON 4 MG/1
4 TABLET, ORALLY DISINTEGRATING ORAL EVERY 6 HOURS PRN
Status: DISCONTINUED | OUTPATIENT
Start: 2024-12-11 | End: 2024-12-12 | Stop reason: HOSPADM

## 2024-12-11 RX ORDER — ONDANSETRON 2 MG/ML
4 INJECTION INTRAMUSCULAR; INTRAVENOUS EVERY 6 HOURS PRN
Status: DISCONTINUED | OUTPATIENT
Start: 2024-12-11 | End: 2024-12-12 | Stop reason: HOSPADM

## 2024-12-11 NOTE — H&P
Pre-Endoscopy History and Physical     Emilia Prince MRN# [id number]   YOB: 1970 Age: 54 year old     Date of Procedure: 12/11/2024  Primary care provider: Alicia Ghosh  Type of Endoscopy: colonoscopy  Reason for Procedure: surveillance - prev tubular adenoma   Type of Anesthesia Anticipated: Moderate Sedation    HPI:    Emilia is a 54 year old female who will be undergoing the above procedure.      A history and physical has been performed. The patient's medications and allergies have been reviewed. The risks and benefits of the procedure and the sedation options and risks were discussed with the patient.  I specifically discussed about possible sedation medication reaction, bleeding, and one of the more rare complications of perforation.  All questions were answered and informed consent was obtained.      She denies a personal or family history of anesthesia complications or bleeding disorders.     Allergies   Allergen Reactions    Cefdinir      (Omnicef)    Contrast Dye      Contrast Media Ready-Box MISC, 05/26/2011.    Did ok with Benadryl with CT with contrast in urgency room Aug. 2022.    Flagyl [Metronidazole]     Metoprolol     Metronidazole Benzoate      (cream)    Imidazole Antifungals Rash    Septra [Sulfamethoxazole W/Trimethoprim] Rash        Cannot display prior to admission medications because the patient has not been admitted in this contact.       Patient Active Problem List   Diagnosis    Sjogren's syndrome (H)    Other and unspecified nonspecific immunological findings    Primary hypercoagulable state (H)    Thyroid cancer (H)    Hypothyroidism    Dysplastic nevi    Cyst of neck    Conductive hearing loss of right ear with unrestricted hearing of left ear    Atrial premature complex    Anxiety state    Adenomatous colon polyp    Anemia    Atypical glandular cells on cervical Pap smear    Depressive disorder    Mixed connective tissue disease (H)    Positive cardiolipin  "antibodies    Ulcerative lesion    Contact dermatitis    Other osteoporosis without current pathological fracture    Pulmonary nodules    Finger joint swelling, left        Past Medical History:   Diagnosis Date    Acute gastric ulcer 11/10/2021    Formatting of this note might be different from the original. Created by Conversion  Replacement Utility updated for latest IMO load    Antiphospholipid antibody syndrome (H)     Benign neoplasm of colon     Created by Conversion     Benign paroxysmal positional vertigo     Created by Conversion     Hx of radiation therapy     thyroid    Malignant neoplasm of thyroid gland (H)     Created by Conversion     Palpitations     Created by Conversion         Past Surgical History:   Procedure Laterality Date    SD THYROIDECTOMY      Description: Thyroid Surgery Total Thyroidectomy;  Recorded: 02/07/2012;       Social History     Tobacco Use    Smoking status: Former     Types: Cigarettes     Passive exposure: Never    Smokeless tobacco: Never   Substance Use Topics    Alcohol use: Yes     Comment: Twice weekly       Family History   Problem Relation Age of Onset    Osteoporosis Mother     Cancer Father     Osteoporosis Maternal Grandmother     Diabetes Maternal Grandmother     Colon Cancer Maternal Grandfather     Depression Son     Anxiety Disorder Son     Breast Cancer No family hx of        REVIEW OF SYSTEMS:     5 point ROS negative except as noted above in HPI, including Gen., Resp., CV, GI &  system review.      PHYSICAL EXAM:   /75 (Cuff Size: Adult Regular)   Pulse 81   Temp 98.2  F (36.8  C) (Temporal)   Resp 18   Wt 62.6 kg (138 lb)   LMP 12/06/2021   SpO2 99%   BMI 23.69 kg/m   Estimated body mass index is 23.69 kg/m  as calculated from the following:    Height as of 11/13/24: 1.626 m (5' 4\").    Weight as of this encounter: 62.6 kg (138 lb).   GENERAL APPEARANCE: healthy, alert, and no distress  MENTAL STATUS: alert  AIRWAY EXAM: Mallampatti Class II " (visualization of the soft palate, fauces, and uvula)  RESP: lungs clear to auscultation - no rales, rhonchi or wheezes  CV: regular rates and rhythm, normal S1 S2, no S3 or S4, no murmur, click or rub, and no irregular beats      DIAGNOSTICS:    Not indicated      IMPRESSION   ASA Class 2 - Mild systemic disease        PLAN:       Plan for colonoscopy. We discussed the risks, benefits and alternatives and the patient wished to proceed.    The above has been forwarded to the consulting provider.      Signed Electronically by: PADMAJA DUARTE MD  December 11, 2024

## 2024-12-19 ENCOUNTER — HOSPITAL ENCOUNTER (OUTPATIENT)
Dept: CT IMAGING | Facility: HOSPITAL | Age: 54
Discharge: HOME | End: 2024-12-19
Attending: FAMILY MEDICINE
Payer: COMMERCIAL

## 2024-12-19 DIAGNOSIS — R91.8 PULMONARY NODULES: ICD-10-CM

## 2024-12-19 DIAGNOSIS — R11.0 NAUSEA: ICD-10-CM

## 2024-12-19 PROCEDURE — 71260 CT THORAX DX C+: CPT

## 2024-12-19 PROCEDURE — 250N000011 HC RX IP 250 OP 636: Performed by: FAMILY MEDICINE

## 2024-12-19 PROCEDURE — 250N000009 HC RX 250: Performed by: FAMILY MEDICINE

## 2024-12-19 RX ORDER — IOPAMIDOL 755 MG/ML
68 INJECTION, SOLUTION INTRAVASCULAR ONCE
Status: COMPLETED | OUTPATIENT
Start: 2024-12-19 | End: 2024-12-19

## 2024-12-19 RX ADMIN — SODIUM CHLORIDE 90 ML: 9 INJECTION, SOLUTION INTRAVENOUS at 14:12

## 2024-12-19 RX ADMIN — IOPAMIDOL 68 ML: 755 INJECTION, SOLUTION INTRAVENOUS at 14:09

## 2024-12-30 ENCOUNTER — TRANSFERRED RECORDS (OUTPATIENT)
Dept: HEALTH INFORMATION MANAGEMENT | Facility: CLINIC | Age: 54
End: 2024-12-30
Payer: COMMERCIAL

## 2025-04-10 ENCOUNTER — TRANSFERRED RECORDS (OUTPATIENT)
Dept: HEALTH INFORMATION MANAGEMENT | Facility: CLINIC | Age: 55
End: 2025-04-10
Payer: COMMERCIAL

## (undated) RX ORDER — FENTANYL CITRATE 50 UG/ML
INJECTION, SOLUTION INTRAMUSCULAR; INTRAVENOUS
Status: DISPENSED
Start: 2024-12-11